# Patient Record
Sex: FEMALE | HISPANIC OR LATINO | Employment: FULL TIME | URBAN - METROPOLITAN AREA
[De-identification: names, ages, dates, MRNs, and addresses within clinical notes are randomized per-mention and may not be internally consistent; named-entity substitution may affect disease eponyms.]

---

## 2018-01-13 NOTE — MISCELLANEOUS
Message   Recorded as Task   Date: 01/12/2016 08:23 AM, Created By: Giovanny Noble   Task Name: Medical Complaint Callback   Assigned To: Rachelle Mtz Schedule   Regarding Patient: Ladarius Samuels, Status: Active   Comment:    JeyFrida - 12 Jan 2016 8:23 AM     TASK CREATED  Caller: Self; Medical Complaint; [phone] (Home); [phone] (Home)  patient delivered 1/1/16 and is complaining of pain when walking  Vaginal delivery  She noticed a lump on the outside of her vagina, and it is very painful  Please advise - [phone]   Tiara Lubin - 12 Jan 2016 9:25 AM     TASK REPLIED TO: Previously Assigned To JORGE OB,Nurse Schedule  p/c to pt appt today our ofc  1020 w/ K  Schea  Active Problems    1  Abnormal TSH (790 6) (R79 89)   2  Bronchitis, asthmatic (493 90) (J45 909)   3  Encounter for fetal anatomic survey (V28 81) (Z36)   4  Encounter for screening for risk of pre-term labor (V28 82) (Z36)   5  Family historic risk of congenital abnormality (655 23) (O35 8XX0)   6  Group beta Strep positive (041 02) (B95 1)   7  Hyperthyroidism in pregnancy, antepartum (648 13,242 90) (O99 280,E05 90)   8  Hyperthyroidism, maternal, antepartum, third trimester (648 13,242 90)   (O99 283,E05 90)   9  Need for Tdap vaccination (V06 1) (Z23)   10  Pregnancy, subsequent (V22 1) (Z34 80)   11  Third trimester pregnancy (V22 2) (Z33 1)   12  Upper respiratory infection (465 9) (J06 9)   13  Vaginal candidiasis (112 1) (B37 3)    Current Meds   1  Complete Prenatal/DHA MISC; Therapy: (Recorded:02Jun2015) to Recorded   2  Magnesium 250 MG Oral Tablet; Therapy: (Recorded:29Oct2015) to Recorded   3  Methimazole 10 MG Oral Tablet; Therapy: (Recorded:21Nxv7696) to Recorded    Allergies    1  No Known Drug Allergies    2  Animal dander   3  Animal dander - Cats   4   Seasonal    Signatures   Electronically signed by : Ross August, ; Jan 12 2016  9:25AM EST                       (Author)

## 2018-01-15 NOTE — PROGRESS NOTES
Assessment    1  Vulvar pain (625 9) (R10 2)    Plan  Vulvar pain    · Emergency Medicine Follow Up Evaluation and Treatment  Follow-up  Status: Complete   Done: [id number]   Ordered; For: Vulvar pain; Ordered By: Sonja Guido Performed:  Due: 10PMH2096; Last Updated By: Herbert Saeed; 2016 1:48:22 PM    Discussion/Summary  Discussion Summary:   Pt presents to the office for the past week she has been having pain that is increasing in her vulva  She went to the store yesterday and was unable to walk due to the pain  She noticed yesterday that her vulva was swelling  on exam she note above pt has two hard areas on her Rt perineum that are deep and painful to contact  There is no streaking or heat, their is no d/c noted when squeezed  She was sent to the ER for evaluation for possible draining of area  May be hematomas  RTO for PP visit  Counseling Documentation With Imm: The patient was counseled regarding instructions for management, patient and family education, risks and benefits of treatment options, importance of compliance with treatment  total time of encounter was 20 minutes and 15 minutes was spent counseling  Chief Complaint    1  Vulvar Pain  Chief Complaint Free Text Note Form: Pt here c/o two painful vaginal lumps , pt had  on 2016  History of Present Illness  Vulvar Pain:   Lizzy Shown presents with complaints of gradual onset of constant episodes of moderate r>l and right vulvar pain, described as sharp and throbbing, non-radiating  On a scale of 1 to 10, the patient rates the pain as 5  Episodes started about 1 week ago  She is currently experiencing vulvar pain  Her symptoms are probably caused by childbirth  Symptoms are made worse by direct contact  Symptoms are worsening  Review of Systems  Focused-Female:   Constitutional: feeling poorly  Genitourinary: as noted in HPI  Active Problems    1  Abnormal TSH (790 6) (R79 89)   2   Bronchitis, asthmatic (493 90) (J45 909)   3  Encounter for fetal anatomic survey (V28 81) (Z36)   4  Family historic risk of congenital abnormality (655 23) (O35 8XX0)   5  Need for Tdap vaccination (V06 1) (Z23)   6  Upper respiratory infection (465 9) (J06 9)   7  Vaginal candidiasis (112 1) (B37 3)    Past Medical History    1  History of Encounter for screening for risk of pre-term labor (V28 82) (Z36)   2  History of Group beta Strep positive (041 02) (B95 1)   3  History of elective  (V13 29) (Z87 42)   4  History of exposure to tuberculosis (V01 1) (Z20 1)   5  History of hepatitis C virus infection (V12 09) (Z86 19)   6  History of influenza (V12 09) (Z87 09)   7  History of streptococcal pharyngitis (V12 09) (Z87 09)   8  History of urinary tract infection (V13 02) (Z87 440)   9  History of Hyperthyroidism in pregnancy, antepartum (648 13,242 90) (O99 280,E05 90)   10  History of Hyperthyroidism, maternal, antepartum, third trimester (648 13,242 90)    (O99 283,E05 90)   11  History of Joint pain (719 40) (M25 50)   12  No pertinent past medical history   13  History of No pertinent past surgical history   14  History of Pregnancy, subsequent (V22 1) (Z34 80)   15  History of Third trimester pregnancy (V22 2) (Z33 1)    Surgical History    1  History of Surgically Induced  - By Dilation And Curettage   2  History of Surgically Induced  - By Dilation And Evacuation    Family History    1  Family history of hypertension (V17 49) (Z82 49)   2  Family history of migraine headaches (V17 2) (Z82 0)   3  Family history of type 2 diabetes mellitus (V18 0) (Z83 3)   4  Family history of Joint pain   5  Family history of Osteopetrosis    6  Family history of hypertension (V17 49) (Z82 49)    7  Family history of malignant neoplasm of stomach (V16 0) (Z80 0)   8  Family history of Osteopetrosis    9  Family history of hypertension (V17 49) (Z82 49)   10   Family history of type 2 diabetes mellitus (V18 0) (Z83 3) 11  Family history of arthritis (V17 7) (Z82 61)   12  Family history of type 2 diabetes mellitus (V18 0) (Z83 3)    13  Family history of hypertension (V17 49) (Z82 49)   14  Family history of myocardial infarction (V17 3) (Z82 49)    15  Family history of varicose veins (V17 49) (Z82 49)    16  Family history of epilepsy (V17 2) (Z82 0)    Social History    · Always uses seat belt   · Former smoker (V15 82) (K68 089)   · Single   · Smokes cigarettes (305 1) (F17 210)    Current Meds   1  Complete Prenatal/DHA MISC; Therapy: (Recorded:02Jun2015) to Recorded   2  Magnesium 250 MG Oral Tablet; Therapy: (Recorded:29Oct2015) to Recorded   3  Methimazole 10 MG Oral Tablet; Therapy: (Recorded:00Qwt9946) to Recorded  Medication List Reviewed: The medication list was reviewed and updated today  Allergies    1  No Known Drug Allergies    2  Animal dander   3  Animal dander - Cats   4  Seasonal    Vitals  Vital Signs [Data Includes: Current Encounter]    Recorded: 68SQT0671 13:85TM   Systolic 985, LUE, Sitting   Diastolic 70, LUE, Sitting   Weight 162 lb    BMI Calculated 26 15   BSA Calculated 1 83   LMP breastfeeding     Physical Exam         pt has 2 hard areas deep in tissue that are causing pain  Stitch in place  Constitutional   General appearance: No acute distress, well appearing and well nourished  Genitourinary   External genitalia: Abnormal   There was swelling of the right labia majora  Vagina: Normal, no lesions or dryness appreciated  Vagina: moderate bleeding  Psychiatric   Orientation to person, place, and time: Normal     Mood and affect: Normal        Future Appointments    Date/Time Provider Specialty Site   02/11/2016 10:00 AM SHAKIRA Page   Obstetrics/Gynecology [facility]     Signatures   Electronically signed by : Vince Valladares[address]; Jan 12 2016  1:05PM EST                       (Author)    Electronically signed by : SHAKIRA Cortes ; Jan 12 2016  2:57PM EST                       (Author)

## 2023-03-22 ENCOUNTER — OFFICE VISIT (OUTPATIENT)
Dept: FAMILY MEDICINE CLINIC | Facility: CLINIC | Age: 33
End: 2023-03-22

## 2023-03-22 VITALS
HEART RATE: 82 BPM | HEIGHT: 66 IN | OXYGEN SATURATION: 98 % | DIASTOLIC BLOOD PRESSURE: 68 MMHG | WEIGHT: 167.5 LBS | BODY MASS INDEX: 26.92 KG/M2 | SYSTOLIC BLOOD PRESSURE: 110 MMHG | TEMPERATURE: 97.8 F

## 2023-03-22 DIAGNOSIS — R10.13 DYSPEPSIA: ICD-10-CM

## 2023-03-22 DIAGNOSIS — R63.5 ABNORMAL WEIGHT GAIN: ICD-10-CM

## 2023-03-22 DIAGNOSIS — Z11.4 SCREENING FOR HIV (HUMAN IMMUNODEFICIENCY VIRUS): ICD-10-CM

## 2023-03-22 DIAGNOSIS — F32.2 SEVERE DEPRESSION (HCC): ICD-10-CM

## 2023-03-22 DIAGNOSIS — H69.83 DYSFUNCTION OF BOTH EUSTACHIAN TUBES: ICD-10-CM

## 2023-03-22 DIAGNOSIS — R79.89 ABNORMAL TSH: ICD-10-CM

## 2023-03-22 DIAGNOSIS — K64.4 EXTERNAL HEMORRHOIDS: ICD-10-CM

## 2023-03-22 DIAGNOSIS — R53.83 OTHER FATIGUE: ICD-10-CM

## 2023-03-22 DIAGNOSIS — Z00.00 ANNUAL PHYSICAL EXAM: Primary | ICD-10-CM

## 2023-03-22 DIAGNOSIS — E55.9 VITAMIN D DEFICIENCY: ICD-10-CM

## 2023-03-22 DIAGNOSIS — Z11.59 NEED FOR HEPATITIS C SCREENING TEST: ICD-10-CM

## 2023-03-22 RX ORDER — OMEPRAZOLE 20 MG/1
20 CAPSULE, DELAYED RELEASE ORAL DAILY
Qty: 30 CAPSULE | Refills: 0 | Status: SHIPPED | OUTPATIENT
Start: 2023-03-22

## 2023-03-22 RX ORDER — FLUOXETINE 10 MG/1
10 CAPSULE ORAL DAILY
Qty: 30 CAPSULE | Refills: 0 | Status: SHIPPED | OUTPATIENT
Start: 2023-03-22

## 2023-03-22 RX ORDER — FLUTICASONE PROPIONATE 50 MCG
1 SPRAY, SUSPENSION (ML) NASAL DAILY
Qty: 9.9 ML | Refills: 0 | Status: SHIPPED | OUTPATIENT
Start: 2023-03-22

## 2023-03-22 RX ORDER — HYDROCORTISONE 25 MG/G
CREAM TOPICAL 2 TIMES DAILY
Qty: 28 G | Refills: 0 | Status: SHIPPED | OUTPATIENT
Start: 2023-03-22

## 2023-03-22 RX ORDER — UREA 10 %
2 LOTION (ML) TOPICAL
COMMUNITY
End: 2023-03-22

## 2023-03-22 NOTE — PROGRESS NOTES
[facility] PRACT[facility]    NAME: Libby Ahmadi  AGE: 35 y o  SEX: female  : 1990     DATE: 3/27/2023     Assessment and Plan:     Problem List Items Addressed This Visit        Other    Abnormal TSH     Patient's been off medication for 7 years  Repeat TSH with T4         Relevant Orders    TSH, 3rd generation with Free T4 reflex (Completed)   Other Visit Diagnoses     Annual physical exam    -  Primary    Relevant Orders    Ambulatory Referral to Obstetrics / Gynecology    Severe depression ([facility] [address] )        Relevant Medications    FLUoxetine (PROzac) 10 mg capsule    Abnormal weight gain        Relevant Orders    Comprehensive metabolic panel (Completed)    Lipid panel (Completed)    Other fatigue        Relevant Orders    CBC and differential (Completed)    Comprehensive metabolic panel (Completed)    Vitamin D deficiency        Relevant Orders    Vitamin D 25 hydroxy (Completed)    Dysfunction of both eustachian tubes        Relevant Medications    fluticasone (FLONASE) 50 mcg/act nasal spray    Need for hepatitis C screening test        Relevant Orders    Hepatitis C Antibody ([facility]) (Completed)    Screening for HIV (human immunodeficiency virus)        Relevant Orders    HIV 1/2 AG/AB w Reflex [facility] for 2 yr old and above (Completed)    Dyspepsia        Relevant Medications    omeprazole (PriLOSEC) 20 mg delayed release capsule    External hemorrhoids        Relevant Medications    hydrocortisone (ANUSOL-HC) 2 5 % rectal cream        Sleep disturbance  Falling and staying  Patient does have good sleep hygiene  Taking Zquil for sleep     Spray CBD for anxiety     Immunizations and preventive care screenings were discussed with patient today  Appropriate education was printed on patient's after visit summary      Counseling:  Alcohol/drug use: discussed moderation in alcohol intake, the recommendations for healthy alcohol use, and avoidance of illicit drug use  Dental Health: discussed importance of regular tooth brushing, flossing, and dental visits  Injury prevention: discussed safety/seat belts, safety helmets, smoke detectors, carbon dioxide detectors, and smoking near bedding or upholstery  Sexual health: discussed sexually transmitted diseases, partner selection, use of condoms, avoidance of unintended pregnancy, and contraceptive alternatives  Exercise: the importance of regular exercise/physical activity was discussed  Recommend exercise 3-5 times per week for at least 30 minutes  BMI Counseling: Body mass index is 27 37 kg/m²  The BMI is above normal  Nutrition recommendations include decreasing portion sizes, consuming healthier snacks, reducing intake of saturated and trans fat and reducing intake of cholesterol  Exercise recommendations include moderate physical activity 150 minutes/week  No pharmacotherapy was ordered  Patient referred to PCP  Rationale for BMI follow-up plan is due to patient being overweight or obese  Depression Screening and Follow-up Plan: Patient's depression screening was positive with a PHQ-2 score of 5  Their PHQ-9 score was 22  Patient assessed for underlying major depression  Brief counseling provided and recommend additional follow-up/re-evaluation next office visit  Patient advised to follow-up with PCP for further management  Start medication and provide resources for therapy         Return in about 4 weeks (around 4/19/2023) for Recheck depression   Chief Complaint:     Chief Complaint   Patient presents with   • Establish Care      History of Present Illness:     Adult Annual Physical   Patient here for a comprehensive physical exam  The patient reports problems - Thyroid, Depression, Anxiety  Diet and Physical Activity  Diet/Nutrition: poor diet and consuming 3-5 servings of fruits/vegetables daily  No red meat  Over/under eating  Exercise: no formal exercise        Depression Screening  PHQ-2/9 Depression Screening    Little interest or pleasure in doing things: 3 - nearly every day  Feeling down, depressed, or hopeless: 3 - nearly every day  Trouble falling or staying asleep, or sleeping too much: 3 - nearly every day  Feeling tired or having little energy: 3 - nearly every day  Poor appetite or overeating: 3 - nearly every day  Feeling bad about yourself - or that you are a failure or have let yourself or your family down: 3 - nearly every day  Trouble concentrating on things, such as reading the newspaper or watching television: 3 - nearly every day  Moving or speaking so slowly that other people could have noticed  Or the opposite - being so fidgety or restless that you have been moving around a lot more than usual: 3 - nearly every day  Thoughts that you would be better off dead, or of hurting yourself in some way: 2 - more than half the days  PHQ-2 Score: 6  PHQ-2 Interpretation: POSITIVE depression screen  PHQ-9 Score: 26   PHQ-9 Interpretation: Severe depression        General Health  Sleep: sleeps poorly, gets 4-6 hours of sleep on average and trouble falling/staying asleep   Hearing: decreased - bilateral  Began after plane ride  Vision: goes for regular eye exams and wears glasses  Dental: no dental visits for >1 year  Will be scheduling an appointment     /GYN Health  Last menstrual period: 3/15/23  5 days   Contraceptive method: none  History of STDs?: no      Review of Systems:     Review of Systems   Constitutional: Positive for activity change, appetite change, fatigue and unexpected weight change  Negative for fever  HENT: Negative for sore throat  Eyes: Negative for visual disturbance  Respiratory: Negative for cough, choking, chest tightness and shortness of breath  Cardiovascular: Negative for chest pain, palpitations and leg swelling  Gastrointestinal: Negative for abdominal pain, constipation, diarrhea and nausea     Endocrine: Negative for cold intolerance and heat intolerance  Genitourinary: Negative for flank pain  Musculoskeletal: Negative for back pain and neck pain  Skin: Negative for rash  Neurological: Negative for headaches  Psychiatric/Behavioral: Positive for decreased concentration and dysphoric mood  Negative for behavioral problems and confusion  The patient is nervous/anxious  Past Medical History:     Past Medical History:   Diagnosis Date   • Disease of thyroid gland       Past Surgical History:     History reviewed  No pertinent surgical history  Social History:     Social History     Socioeconomic History   • Marital status:      Spouse name: None   • Number of children: None   • Years of education: None   • Highest education level: None   Occupational History   • None   Tobacco Use   • Smoking status: Never     Passive exposure: Past (occassionally, not daily)   • Smokeless tobacco: None   Substance and Sexual Activity   • Alcohol use: No   • Drug use:  Yes   • Sexual activity: Yes     Partners: Female   Other Topics Concern   • None   Social History Narrative   • None     Social Determinants of Health     Financial Resource Strain: Not on file   Food Insecurity: Not on file   Transportation Needs: Not on file   Physical Activity: Not on file   Stress: Not on file   Social Connections: Not on file   Intimate Partner Violence: Not on file   Housing Stability: Not on file      Family History:     Family History   Problem Relation Age of Onset   • Hyperlipidemia Mother    • Hypertension Mother    • Hyperlipidemia Father    • Hypertension Father    • Heart disease Maternal Grandmother    • Diabetes Maternal Grandmother    • Stomach cancer Maternal Grandmother    • Heart disease Maternal Grandfather    • Diabetes Maternal Grandfather    • Heart disease Paternal Grandmother    • Diabetes Paternal Grandmother    • Heart disease Paternal Grandfather    • Diabetes Paternal Grandfather       Current Medications: "    Current Outpatient Medications   Medication Sig Dispense Refill   • FLUoxetine (PROzac) 10 mg capsule Take 1 capsule (10 mg total) by mouth daily 30 capsule 0   • fluticasone (FLONASE) 50 mcg/act nasal spray 1 spray into each nostril daily 9 9 mL 0   • hydrocortisone (ANUSOL-HC) 2 5 % rectal cream Apply topically 2 (two) times a day 28 g 0   • omeprazole (PriLOSEC) 20 mg delayed release capsule Take 1 capsule (20 mg total) by mouth daily 30 capsule 0   • Cholecalciferol 2000 UNITS CAPS Take by mouth  (Patient not taking: Reported on 3/22/2023)     • Magnesium 500 MG CAPS Take 500 mg by mouth  (Patient not taking: Reported on 3/22/2023)     • METHIMAZOLE PO Take 5 mg by mouth  (Patient not taking: Reported on 3/22/2023)       No current facility-administered medications for this visit  Allergies: Allergies   Allergen Reactions   • Pollen Extract Other (See Comments)   • Other Rash      Physical Exam:     /68 (BP Location: Left arm, Patient Position: Sitting, Cuff Size: Large)   Pulse 82   Temp 97 8 °F (36 6 °C)   Ht 5' 5 6\" (1 666 m)   Wt 76 kg (167 lb 8 oz)   SpO2 98%   BMI 27 37 kg/m²     Physical Exam  Vitals and nursing note reviewed  Constitutional:       General: She is in acute distress  Appearance: Normal appearance  She is well-developed  HENT:      Head: Normocephalic and atraumatic  Nose: Congestion present  Eyes:      Extraocular Movements: Extraocular movements intact  Conjunctiva/sclera: Conjunctivae normal       Pupils: Pupils are equal, round, and reactive to light  Cardiovascular:      Rate and Rhythm: Normal rate and regular rhythm  Heart sounds: Normal heart sounds  Pulmonary:      Effort: Pulmonary effort is normal       Breath sounds: Normal breath sounds  Abdominal:      General: Bowel sounds are normal       Palpations: Abdomen is soft  Musculoskeletal:         General: Normal range of motion  Cervical back: Normal range of motion   " Skin:     General: Skin is warm and dry  Neurological:      General: No focal deficit present  Mental Status: She is alert and oriented to person, place, and time  Psychiatric:         Attention and Perception: She is inattentive  Mood and Affect: Mood is anxious and depressed  Affect is tearful  Speech: Speech normal          Thought Content: Thought content includes suicidal ideation  Thought content does not include homicidal or suicidal plan           Cognition and Memory: Cognition normal           Andrew Srivastava MD   [id number] [address]

## 2023-03-23 ENCOUNTER — APPOINTMENT (OUTPATIENT)
Dept: LAB | Facility: HOSPITAL | Age: 33
End: 2023-03-23
Attending: FAMILY MEDICINE

## 2023-03-23 DIAGNOSIS — R63.5 ABNORMAL WEIGHT GAIN: ICD-10-CM

## 2023-03-23 DIAGNOSIS — R79.89 ABNORMAL TSH: ICD-10-CM

## 2023-03-23 DIAGNOSIS — E55.9 VITAMIN D DEFICIENCY: ICD-10-CM

## 2023-03-23 DIAGNOSIS — R53.83 OTHER FATIGUE: ICD-10-CM

## 2023-03-23 DIAGNOSIS — Z11.4 SCREENING FOR HIV (HUMAN IMMUNODEFICIENCY VIRUS): ICD-10-CM

## 2023-03-23 DIAGNOSIS — Z11.59 NEED FOR HEPATITIS C SCREENING TEST: ICD-10-CM

## 2023-03-23 LAB
25(OH)D3 SERPL-MCNC: 22.7 NG/ML (ref 30–100)
ALBUMIN SERPL BCP-MCNC: 4.2 G/DL (ref 3.5–5)
ALP SERPL-CCNC: 56 U/L (ref 34–104)
ALT SERPL W P-5'-P-CCNC: 7 U/L (ref 7–52)
ANION GAP SERPL CALCULATED.3IONS-SCNC: 8 MMOL/L (ref 4–13)
AST SERPL W P-5'-P-CCNC: 9 U/L (ref 13–39)
BASOPHILS # BLD AUTO: 0.08 THOUSANDS/ÂΜL (ref 0–0.1)
BASOPHILS NFR BLD AUTO: 1 % (ref 0–1)
BILIRUB SERPL-MCNC: 0.33 MG/DL (ref 0.2–1)
BUN SERPL-MCNC: 9 MG/DL (ref 5–25)
CALCIUM SERPL-MCNC: 9.3 MG/DL (ref 8.4–10.2)
CHLORIDE SERPL-SCNC: 102 MMOL/L (ref 96–108)
CHOLEST SERPL-MCNC: 174 MG/DL
CO2 SERPL-SCNC: 26 MMOL/L (ref 21–32)
CREAT SERPL-MCNC: 0.56 MG/DL (ref 0.6–1.3)
EOSINOPHIL # BLD AUTO: 0.61 THOUSAND/ÂΜL (ref 0–0.61)
EOSINOPHIL NFR BLD AUTO: 8 % (ref 0–6)
ERYTHROCYTE [DISTWIDTH] IN BLOOD BY AUTOMATED COUNT: 13.5 % (ref 11.6–15.1)
GFR SERPL CREATININE-BSD FRML MDRD: 122 ML/MIN/1.73SQ M
GLUCOSE P FAST SERPL-MCNC: 92 MG/DL (ref 65–99)
HCT VFR BLD AUTO: 41.5 % (ref 34.8–46.1)
HCV AB SER QL: NORMAL
HDLC SERPL-MCNC: 50 MG/DL
HGB BLD-MCNC: 13.6 G/DL (ref 11.5–15.4)
HIV 1+2 AB+HIV1 P24 AG SERPL QL IA: NORMAL
HIV 2 AB SERPL QL IA: NORMAL
HIV1 AB SERPL QL IA: NORMAL
HIV1 P24 AG SERPL QL IA: NORMAL
IMM GRANULOCYTES # BLD AUTO: 0.01 THOUSAND/UL (ref 0–0.2)
IMM GRANULOCYTES NFR BLD AUTO: 0 % (ref 0–2)
LDLC SERPL CALC-MCNC: 101 MG/DL (ref 0–100)
LYMPHOCYTES # BLD AUTO: 2.27 THOUSANDS/ÂΜL (ref 0.6–4.47)
LYMPHOCYTES NFR BLD AUTO: 30 % (ref 14–44)
MCH RBC QN AUTO: 28.4 PG (ref 26.8–34.3)
MCHC RBC AUTO-ENTMCNC: 32.8 G/DL (ref 31.4–37.4)
MCV RBC AUTO: 87 FL (ref 82–98)
MONOCYTES # BLD AUTO: 0.32 THOUSAND/ÂΜL (ref 0.17–1.22)
MONOCYTES NFR BLD AUTO: 4 % (ref 4–12)
NEUTROPHILS # BLD AUTO: 4.22 THOUSANDS/ÂΜL (ref 1.85–7.62)
NEUTS SEG NFR BLD AUTO: 57 % (ref 43–75)
NONHDLC SERPL-MCNC: 124 MG/DL
NRBC BLD AUTO-RTO: 0 /100 WBCS
PLATELET # BLD AUTO: 460 THOUSANDS/UL (ref 149–390)
PMV BLD AUTO: 9.4 FL (ref 8.9–12.7)
POTASSIUM SERPL-SCNC: 3.8 MMOL/L (ref 3.5–5.3)
PROT SERPL-MCNC: 7.8 G/DL (ref 6.4–8.4)
RBC # BLD AUTO: 4.79 MILLION/UL (ref 3.81–5.12)
SODIUM SERPL-SCNC: 136 MMOL/L (ref 135–147)
TRIGL SERPL-MCNC: 115 MG/DL
TSH SERPL DL<=0.05 MIU/L-ACNC: 3.69 UIU/ML (ref 0.45–4.5)
WBC # BLD AUTO: 7.51 THOUSAND/UL (ref 4.31–10.16)

## 2023-04-03 ENCOUNTER — ANNUAL EXAM (OUTPATIENT)
Dept: OBGYN CLINIC | Facility: CLINIC | Age: 33
End: 2023-04-03

## 2023-04-03 VITALS
HEIGHT: 66 IN | SYSTOLIC BLOOD PRESSURE: 112 MMHG | DIASTOLIC BLOOD PRESSURE: 60 MMHG | WEIGHT: 174 LBS | BODY MASS INDEX: 27.97 KG/M2

## 2023-04-03 DIAGNOSIS — K64.4 EXTERNAL HEMORRHOID: ICD-10-CM

## 2023-04-03 DIAGNOSIS — Z11.3 SCREEN FOR STD (SEXUALLY TRANSMITTED DISEASE): ICD-10-CM

## 2023-04-03 DIAGNOSIS — Z01.411 ENCOUNTER FOR GYNECOLOGICAL EXAMINATION WITH ABNORMAL FINDING: Primary | ICD-10-CM

## 2023-04-03 DIAGNOSIS — Z00.00 ANNUAL PHYSICAL EXAM: ICD-10-CM

## 2023-04-03 NOTE — PROGRESS NOTES
Assessment/Plan:    No problem-specific Assessment & Plan notes found for this encounter  Diagnoses and all orders for this visit:    Encounter for gynecological examination with abnormal finding  -     Liquid-based pap, screening    Screen for STD (sexually transmitted disease)  -     Chlamydia/GC amplified DNA by PCR    External hemorrhoid  -     Ambulatory Referral to Colorectal Surgery; Future    Annual physical exam  -     Ambulatory Referral to Obstetrics / Gynecology        Pap and GC/chlamydia screening done  We will call with STD testing results  Referral to colorectal for hemorrhoid entered; patient to call for appointment  Call if periods worsen or change  If no problems, patient to return in 1 year for routine gyn care  Subjective:      Patient ID: Alysha Sandoval is a 35 y o  female  Patient is a  here for yearly gyn exam   She is new to our office today  Has not been seen by a gyn since 2016  States she is doing well overall  Periods are regular once a month, and bleeding lasts for 4 days  Experiences bloating, mood swings, and fluid retention  Denies heavy bleeding and severe cramping  Patient is sexually active with a female partner; requests STD screening  Complains of a recurrent external hemorrhoid; currently using OTC cream   Denies bowel/bladder changes, pelvic pain, abdominal pain, n/v, and change in appetite  Followed by PCP for thyroid issue  Patient is performing self-breast exam   Has tenderness with her periods  Denies new masses, skin changes, and nipple discharge  Family cancer history negative  The following portions of the patient's history were reviewed and updated as appropriate: allergies, current medications, past family history, past medical history, past social history, past surgical history and problem list     Review of Systems   Constitutional: Negative for appetite change and unexpected weight change     Cardiovascular:        No masses, "skin changes, nipple discharge, and pain/tenderness  Gastrointestinal: Negative for abdominal distention, abdominal pain, constipation, diarrhea, nausea and vomiting  Genitourinary: Negative for difficulty urinating, dysuria, frequency, genital sores, hematuria, menstrual problem, pelvic pain, urgency, vaginal bleeding, vaginal discharge and vaginal pain  Objective:      /60 (BP Location: Left arm, Patient Position: Sitting, Cuff Size: Adult)   Ht 5' 5 6\" (1 666 m)   Wt 78 9 kg (174 lb)   LMP 03/15/2023 (Exact Date)   BMI 28 43 kg/m²          Physical Exam  Vitals reviewed  Exam conducted with a chaperone present  Constitutional:       Appearance: Normal appearance  She is well-developed  Neck:      Thyroid: No thyromegaly  Pulmonary:      Effort: Pulmonary effort is normal    Chest:   Breasts:     Breasts are symmetrical       Right: Normal  No swelling, bleeding, inverted nipple, mass, nipple discharge, skin change or tenderness  Left: Normal  No swelling, bleeding, inverted nipple, mass, nipple discharge, skin change or tenderness  Abdominal:      General: Abdomen is flat  There is no distension  Palpations: Abdomen is soft  Tenderness: There is no abdominal tenderness  Genitourinary:     General: Normal vulva  Pubic Area: No rash  Labia:         Right: No rash, tenderness, lesion or injury  Left: No rash, tenderness, lesion or injury  Vagina: Normal  No vaginal discharge, erythema, tenderness or bleeding  Cervix: Normal       Uterus: Normal        Adnexa: Right adnexa normal and left adnexa normal         Right: No mass, tenderness or fullness  Left: No mass, tenderness or fullness  Rectum: External hemorrhoid present  Musculoskeletal:      Cervical back: Neck supple  Lymphadenopathy:      Cervical: No cervical adenopathy  Upper Body:      Right upper body: No supraclavicular or axillary adenopathy        Left " upper body: No supraclavicular or axillary adenopathy  Lower Body: No right inguinal adenopathy  No left inguinal adenopathy  Skin:     General: Skin is warm and dry  Neurological:      Mental Status: She is alert and oriented to person, place, and time  Psychiatric:         Mood and Affect: Mood normal          Behavior: Behavior normal  Behavior is cooperative  Thought Content:  Thought content normal          Judgment: Judgment normal

## 2023-04-05 LAB
C TRACH DNA SPEC QL NAA+PROBE: NEGATIVE
N GONORRHOEA DNA SPEC QL NAA+PROBE: NEGATIVE

## 2023-04-19 PROBLEM — R10.13 DYSPEPSIA: Status: ACTIVE | Noted: 2023-04-19

## 2023-04-19 PROBLEM — G47.9 SLEEP DISTURBANCE: Status: ACTIVE | Noted: 2023-04-19

## 2023-04-19 PROBLEM — F32.2 SEVERE DEPRESSION (HCC): Status: ACTIVE | Noted: 2023-04-19

## 2023-10-18 ENCOUNTER — OFFICE VISIT (OUTPATIENT)
Dept: FAMILY MEDICINE CLINIC | Facility: CLINIC | Age: 33
End: 2023-10-18
Payer: COMMERCIAL

## 2023-10-18 ENCOUNTER — APPOINTMENT (OUTPATIENT)
Dept: LAB | Facility: HOSPITAL | Age: 33
End: 2023-10-18
Attending: FAMILY MEDICINE
Payer: COMMERCIAL

## 2023-10-18 VITALS
DIASTOLIC BLOOD PRESSURE: 70 MMHG | OXYGEN SATURATION: 98 % | RESPIRATION RATE: 16 BRPM | HEIGHT: 66 IN | BODY MASS INDEX: 27 KG/M2 | SYSTOLIC BLOOD PRESSURE: 110 MMHG | TEMPERATURE: 97.9 F | WEIGHT: 168 LBS | HEART RATE: 73 BPM

## 2023-10-18 DIAGNOSIS — M62.89 MUSCLE STIFFNESS: ICD-10-CM

## 2023-10-18 DIAGNOSIS — M79.89 SWELLING OF BOTH HANDS: ICD-10-CM

## 2023-10-18 DIAGNOSIS — F32.2 SEVERE DEPRESSION (HCC): ICD-10-CM

## 2023-10-18 DIAGNOSIS — R89.8 EOSINOPHIL COUNT RAISED: ICD-10-CM

## 2023-10-18 DIAGNOSIS — M25.542 ARTHRALGIA OF BOTH HANDS: ICD-10-CM

## 2023-10-18 DIAGNOSIS — G47.9 SLEEP DISTURBANCE: ICD-10-CM

## 2023-10-18 DIAGNOSIS — R10.13 DYSPEPSIA: ICD-10-CM

## 2023-10-18 DIAGNOSIS — M25.541 ARTHRALGIA OF BOTH HANDS: ICD-10-CM

## 2023-10-18 DIAGNOSIS — R89.9 ABNORMAL LABORATORY TEST: ICD-10-CM

## 2023-10-18 DIAGNOSIS — Z00.00 ANNUAL PHYSICAL EXAM: Primary | ICD-10-CM

## 2023-10-18 DIAGNOSIS — R79.89 ABNORMAL TSH: ICD-10-CM

## 2023-10-18 DIAGNOSIS — Z83.2 FAMILY HISTORY OF AUTOIMMUNE DISORDER: ICD-10-CM

## 2023-10-18 LAB
ALBUMIN SERPL BCP-MCNC: 4.1 G/DL (ref 3.5–5)
ALP SERPL-CCNC: 58 U/L (ref 34–104)
ALT SERPL W P-5'-P-CCNC: 11 U/L (ref 7–52)
ANA SER QL IA: NEGATIVE
ANION GAP SERPL CALCULATED.3IONS-SCNC: 8 MMOL/L
AST SERPL W P-5'-P-CCNC: 13 U/L (ref 13–39)
BASOPHILS # BLD AUTO: 0.06 THOUSANDS/ÂΜL (ref 0–0.1)
BASOPHILS NFR BLD AUTO: 1 % (ref 0–1)
BILIRUB SERPL-MCNC: 0.25 MG/DL (ref 0.2–1)
BUN SERPL-MCNC: 13 MG/DL (ref 5–25)
CALCIUM SERPL-MCNC: 9 MG/DL (ref 8.4–10.2)
CHLORIDE SERPL-SCNC: 104 MMOL/L (ref 96–108)
CO2 SERPL-SCNC: 24 MMOL/L (ref 21–32)
CREAT SERPL-MCNC: 0.62 MG/DL (ref 0.6–1.3)
CREAT UR-MCNC: 125.3 MG/DL
CRP SERPL QL: 8.5 MG/L
EOSINOPHIL # BLD AUTO: 0.5 THOUSAND/ÂΜL (ref 0–0.61)
EOSINOPHIL NFR BLD AUTO: 7 % (ref 0–6)
ERYTHROCYTE [DISTWIDTH] IN BLOOD BY AUTOMATED COUNT: 13.7 % (ref 11.6–15.1)
ERYTHROCYTE [SEDIMENTATION RATE] IN BLOOD: 19 MM/HOUR (ref 0–19)
FERRITIN SERPL-MCNC: 23 NG/ML (ref 11–307)
GFR SERPL CREATININE-BSD FRML MDRD: 118 ML/MIN/1.73SQ M
GLUCOSE P FAST SERPL-MCNC: 88 MG/DL (ref 65–99)
HCT VFR BLD AUTO: 38 % (ref 34.8–46.1)
HGB BLD-MCNC: 12.1 G/DL (ref 11.5–15.4)
IMM GRANULOCYTES # BLD AUTO: 0.03 THOUSAND/UL (ref 0–0.2)
IMM GRANULOCYTES NFR BLD AUTO: 0 % (ref 0–2)
IRON SATN MFR SERPL: 11 % (ref 15–50)
IRON SERPL-MCNC: 35 UG/DL (ref 50–212)
LYMPHOCYTES # BLD AUTO: 2.18 THOUSANDS/ÂΜL (ref 0.6–4.47)
LYMPHOCYTES NFR BLD AUTO: 30 % (ref 14–44)
MCH RBC QN AUTO: 27.6 PG (ref 26.8–34.3)
MCHC RBC AUTO-ENTMCNC: 31.8 G/DL (ref 31.4–37.4)
MCV RBC AUTO: 87 FL (ref 82–98)
MICROALBUMIN UR-MCNC: <7 MG/L
MICROALBUMIN/CREAT 24H UR: <6 MG/G CREATININE (ref 0–30)
MONOCYTES # BLD AUTO: 0.32 THOUSAND/ÂΜL (ref 0.17–1.22)
MONOCYTES NFR BLD AUTO: 4 % (ref 4–12)
NEUTROPHILS # BLD AUTO: 4.12 THOUSANDS/ÂΜL (ref 1.85–7.62)
NEUTS SEG NFR BLD AUTO: 58 % (ref 43–75)
NRBC BLD AUTO-RTO: 0 /100 WBCS
PLATELET # BLD AUTO: 450 THOUSANDS/UL (ref 149–390)
PMV BLD AUTO: 9.2 FL (ref 8.9–12.7)
POTASSIUM SERPL-SCNC: 3.8 MMOL/L (ref 3.5–5.3)
PROT SERPL-MCNC: 7.4 G/DL (ref 6.4–8.4)
RBC # BLD AUTO: 4.38 MILLION/UL (ref 3.81–5.12)
SODIUM SERPL-SCNC: 136 MMOL/L (ref 135–147)
TIBC SERPL-MCNC: 325 UG/DL (ref 250–450)
TSH SERPL DL<=0.05 MIU/L-ACNC: 2.13 UIU/ML (ref 0.45–4.5)
UIBC SERPL-MCNC: 290 UG/DL (ref 155–355)
WBC # BLD AUTO: 7.21 THOUSAND/UL (ref 4.31–10.16)

## 2023-10-18 PROCEDURE — 82570 ASSAY OF URINE CREATININE: CPT

## 2023-10-18 PROCEDURE — 86140 C-REACTIVE PROTEIN: CPT

## 2023-10-18 PROCEDURE — 36415 COLL VENOUS BLD VENIPUNCTURE: CPT

## 2023-10-18 PROCEDURE — 86200 CCP ANTIBODY: CPT

## 2023-10-18 PROCEDURE — 83550 IRON BINDING TEST: CPT

## 2023-10-18 PROCEDURE — 85025 COMPLETE CBC W/AUTO DIFF WBC: CPT

## 2023-10-18 PROCEDURE — 82043 UR ALBUMIN QUANTITATIVE: CPT

## 2023-10-18 PROCEDURE — 86038 ANTINUCLEAR ANTIBODIES: CPT

## 2023-10-18 PROCEDURE — 85652 RBC SED RATE AUTOMATED: CPT

## 2023-10-18 PROCEDURE — 84443 ASSAY THYROID STIM HORMONE: CPT

## 2023-10-18 PROCEDURE — 82728 ASSAY OF FERRITIN: CPT

## 2023-10-18 PROCEDURE — 86430 RHEUMATOID FACTOR TEST QUAL: CPT

## 2023-10-18 PROCEDURE — 99395 PREV VISIT EST AGE 18-39: CPT | Performed by: FAMILY MEDICINE

## 2023-10-18 PROCEDURE — 80053 COMPREHEN METABOLIC PANEL: CPT

## 2023-10-18 PROCEDURE — 83540 ASSAY OF IRON: CPT

## 2023-10-18 RX ORDER — FLUOXETINE HYDROCHLORIDE 20 MG/1
20 CAPSULE ORAL DAILY
Qty: 90 CAPSULE | Refills: 3 | Status: SHIPPED | OUTPATIENT
Start: 2023-10-18 | End: 2024-10-12

## 2023-10-18 NOTE — PROGRESS NOTES
[facility]    NAME: Jhoan Weeks  AGE: 35 y.o. SEX: female  : 1990     DATE: 10/20/2023     Assessment and Plan:     Problem List Items Addressed This Visit        Other    Abnormal TSH     Hyperthyroid while pregnant. Off medications for the past 7 years. Repeat TSH. Relevant Orders    TSH, 3rd generation with Free T4 reflex (Completed)    Dyspepsia     Stable. Takes medication as needed. Has tried to adjust her diet to account for the acid reflux. Severe depression (HCC)    Relevant Medications    FLUoxetine (PROzac) 20 mg capsule    Sleep disturbance   Other Visit Diagnoses     Annual physical exam    -  Primary    Eosinophil count raised        Relevant Orders    CBC and differential (Completed)    Arthralgia of both hands        Relevant Orders    MAKENNA Screen w/ Reflex to Titer/Pattern (Completed)    RF Screen w/ Reflex to Titer (Completed)    Cyclic citrul peptide antibody, IgG (Completed)    C-reactive protein (Completed)    Sedimentation rate, automated (Completed)    Swelling of both hands        Relevant Orders    MAKENNA Screen w/ Reflex to Titer/Pattern (Completed)    Comprehensive metabolic panel (Completed)    Albumin / creatinine urine ratio (Completed)    Sedimentation rate, automated (Completed)    Family history of autoimmune disorder        Muscle stiffness        Relevant Orders    Sedimentation rate, automated (Completed)    Abnormal laboratory test        Relevant Orders    Iron Panel (Includes Ferritin, Iron Sat%, Iron, and TIBC) (Completed)        Patient's grandmother had rheumatoid arthritis. Patient's half sister has lupus. Obtain labs. Consider US for synovitis. Obtain labwork listed above. Follow up in 6 weeks. Immunizations and preventive care screenings were discussed with patient today.  Appropriate education was printed on patient's after visit summary. Counseling:  Alcohol/drug use: discussed moderation in alcohol intake, the recommendations for healthy alcohol use, and avoidance of illicit drug use. Dental Health: discussed importance of regular tooth brushing, flossing, and dental visits. Injury prevention: discussed safety/seat belts, safety helmets, smoke detectors, carbon dioxide detectors, and smoking near bedding or upholstery. Sexual health: discussed sexually transmitted diseases, partner selection, use of condoms, avoidance of unintended pregnancy, and contraceptive alternatives. Exercise: the importance of regular exercise/physical activity was discussed. Recommend exercise 3-5 times per week for at least 30 minutes. Return in about 6 weeks (around 2023) for 6 week recheck 15m. Chief Complaint:     Chief Complaint   Patient presents with   • Physical Exam   • Edema     Feet & Hands over the last x 1 mon      History of Present Illness:     Adult Annual Physical   Patient here for a comprehensive physical exam. The patient reports problems - see below . Feet and hands swollen in the morning. Stiff. Painful. This will last a few days. Experienced this during puberty as well. Currently both sides. When she was younger it was localized to 1 side. Neck and shoulder stiffness. Struggles to get gloves on due to swelling and pain. Diet and Physical Activity  Diet/Nutrition:  Processed foods. Frozen foods. Cost prohibitive  . Exercise: walking.       Depression Screening  PHQ-2/9 Depression Screening    Little interest or pleasure in doing things: 0 - not at all  Feeling down, depressed, or hopeless: 0 - not at all  Trouble falling or staying asleep, or sleeping too much: 0 - not at all  Feeling tired or having little energy: 0 - not at all  Poor appetite or overeatin - not at all  Feeling bad about yourself - or that you are a failure or have let yourself or your family down: 0 - not at all  Trouble concentrating on things, such as reading the newspaper or watching television: 0 - not at all  Moving or speaking so slowly that other people could have noticed. Or the opposite - being so fidgety or restless that you have been moving around a lot more than usual: 0 - not at all  Thoughts that you would be better off dead, or of hurting yourself in some way: 0 - not at all  PHQ-9 Score: 0   PHQ-9 Interpretation: No or Minimal depression        General Health  Sleep: sleeps poorly and gets 4-6 hours of sleep on average. Hearing: normal - bilateral.  Vision: goes for regular eye exams and wears glasses. Dental: no dental visits for >1 year and brushes teeth twice daily. /GYN Health  Last menstrual period: Regular periods. Review of Systems:     Review of Systems   Constitutional:  Positive for fatigue. Negative for fever. Hair loss      HENT:  Negative for sore throat. Eyes:  Negative for visual disturbance. Respiratory:  Negative for cough, chest tightness and shortness of breath. Cardiovascular:  Negative for chest pain, palpitations and leg swelling. Gastrointestinal:  Negative for abdominal pain, constipation, diarrhea and nausea. Endocrine: Positive for cold intolerance. Negative for heat intolerance. Genitourinary:  Negative for flank pain. Musculoskeletal:  Positive for arthralgias and myalgias. Negative for back pain and neck pain. Pain and swelling hands/feet  Painful extremities when exposed to cold temperatures    Skin:  Negative for rash. Neurological:  Negative for headaches. Psychiatric/Behavioral:  Negative for behavioral problems and confusion. Past Medical History:     Past Medical History:   Diagnosis Date   • Disease of thyroid gland       Past Surgical History:     History reviewed. No pertinent surgical history.    Social History:     Social History     Socioeconomic History   • Marital status:      Spouse name: None   • Number of children: None   • Years of education: None   • Highest education level: None   Occupational History   • None   Tobacco Use   • Smoking status: Never     Passive exposure: Past (occassionally, not daily)   • Smokeless tobacco: None   Vaping Use   • Vaping Use: Never used   Substance and Sexual Activity   • Alcohol use: Yes     Comment: socially   • Drug use: Never   • Sexual activity: Yes     Partners: Female   Other Topics Concern   • None   Social History Narrative   • None     Social Determinants of Health     Financial Resource Strain: Not on file   Food Insecurity: Not on file   Transportation Needs: Not on file   Physical Activity: Not on file   Stress: Not on file   Social Connections: Not on file   Intimate Partner Violence: Not on file   Housing Stability: Not on file      Family History:     Family History   Problem Relation Age of Onset   • Hyperlipidemia Mother    • Hypertension Mother    • Hyperlipidemia Father    • Hypertension Father    • Heart disease Maternal Grandmother    • Diabetes Maternal Grandmother    • Stomach cancer Maternal Grandmother    • Heart disease Maternal Grandfather    • Diabetes Maternal Grandfather    • Heart disease Paternal Grandmother    • Diabetes Paternal Grandmother    • Heart disease Paternal Grandfather    • Diabetes Paternal Grandfather       Current Medications:     Current Outpatient Medications   Medication Sig Dispense Refill   • Cholecalciferol 2000 UNITS CAPS Take by mouth     • doxylamine (UNISOM) 25 MG tablet Take 1 tablet (25 mg total) by mouth daily at bedtime as needed for sleep 30 tablet 0   • FLUoxetine (PROzac) 20 mg capsule Take 1 capsule (20 mg total) by mouth daily 90 capsule 3   • fluticasone (FLONASE) 50 mcg/act nasal spray 1 spray into each nostril daily 9.9 mL 0   • hydrocortisone (ANUSOL-HC) 2.5 % rectal cream Apply topically 2 (two) times a day 28 g 0   • Magnesium 500 MG CAPS Take 500 mg by mouth     • omeprazole (PriLOSEC) 20 mg delayed release capsule Take 1 capsule (20 mg total) by mouth daily 30 capsule 0   • METHIMAZOLE PO Take 5 mg by mouth. (Patient not taking: Reported on 3/22/2023)       No current facility-administered medications for this visit. Allergies: Allergies   Allergen Reactions   • Pollen Extract Other (See Comments)   • Other Rash      Physical Exam:     /70 (BP Location: Left arm, Patient Position: Sitting, Cuff Size: Standard)   Pulse 73   Temp 97.9 °F (36.6 °C)   Resp 16   Ht 5' 5.6" (1.666 m)   Wt 76.2 kg (168 lb)   SpO2 98%   BMI 27.45 kg/m²     Physical Exam  Vitals and nursing note reviewed. Constitutional:       Appearance: Normal appearance. She is well-developed. She is not ill-appearing. HENT:      Head: Normocephalic and atraumatic. Eyes:      Extraocular Movements: Extraocular movements intact. Pupils: Pupils are equal, round, and reactive to light. Cardiovascular:      Rate and Rhythm: Normal rate and regular rhythm. Pulmonary:      Effort: Pulmonary effort is normal.      Breath sounds: Normal breath sounds. Abdominal:      General: Bowel sounds are normal.      Palpations: Abdomen is soft. Musculoskeletal:         General: Swelling and tenderness present. Cervical back: Normal range of motion. Skin:     General: Skin is warm and dry. Neurological:      General: No focal deficit present. Mental Status: She is alert and oriented to person, place, and time.    Psychiatric:         Mood and Affect: Mood normal.         Speech: Speech normal.         Behavior: Behavior normal.          Trey Montanez MD   [address]

## 2023-10-19 LAB — RHEUMATOID FACT SER QL LA: NEGATIVE

## 2023-10-20 LAB — CCP AB SER IA-ACNC: 1.4

## 2023-11-02 DIAGNOSIS — D50.9 IRON DEFICIENCY ANEMIA, UNSPECIFIED IRON DEFICIENCY ANEMIA TYPE: Primary | ICD-10-CM

## 2023-11-02 RX ORDER — FERROUS SULFATE 324(65)MG
324 TABLET, DELAYED RELEASE (ENTERIC COATED) ORAL
Qty: 90 TABLET | Refills: 0 | Status: SHIPPED | OUTPATIENT
Start: 2023-11-02

## 2023-11-29 ENCOUNTER — OFFICE VISIT (OUTPATIENT)
Dept: FAMILY MEDICINE CLINIC | Facility: CLINIC | Age: 33
End: 2023-11-29
Payer: COMMERCIAL

## 2023-11-29 VITALS
HEIGHT: 66 IN | WEIGHT: 165 LBS | HEART RATE: 82 BPM | RESPIRATION RATE: 16 BRPM | BODY MASS INDEX: 26.52 KG/M2 | SYSTOLIC BLOOD PRESSURE: 114 MMHG | TEMPERATURE: 97.5 F | DIASTOLIC BLOOD PRESSURE: 74 MMHG | OXYGEN SATURATION: 96 %

## 2023-11-29 DIAGNOSIS — M25.542 ARTHRALGIA OF BOTH HANDS: ICD-10-CM

## 2023-11-29 DIAGNOSIS — R10.13 DYSPEPSIA: ICD-10-CM

## 2023-11-29 DIAGNOSIS — Z83.2 FAMILY HISTORY OF AUTOIMMUNE DISORDER: ICD-10-CM

## 2023-11-29 DIAGNOSIS — R79.0 LOW FERRITIN: ICD-10-CM

## 2023-11-29 DIAGNOSIS — F32.2 SEVERE DEPRESSION (HCC): Primary | ICD-10-CM

## 2023-11-29 DIAGNOSIS — D69.6 THROMBOCYTOPENIA (HCC): ICD-10-CM

## 2023-11-29 DIAGNOSIS — M25.541 ARTHRALGIA OF BOTH HANDS: ICD-10-CM

## 2023-11-29 PROCEDURE — 99215 OFFICE O/P EST HI 40 MIN: CPT | Performed by: FAMILY MEDICINE

## 2023-11-29 RX ORDER — MELOXICAM 15 MG/1
15 TABLET ORAL DAILY PRN
Qty: 30 TABLET | Refills: 0 | Status: SHIPPED | OUTPATIENT
Start: 2023-11-29

## 2023-11-29 NOTE — PROGRESS NOTES
Name: Jhoan Weeks      : 1990      MRN: [id number]  Encounter Provider: Trish Kamara MD  Encounter Date: 2023   Encounter department: [address] [place] [address]     1. Severe depression ([address])  Assessment & Plan:  Continue prozac 20mg       2. Dyspepsia  Assessment & Plan:  Continue omeprazole 20 mg daily      3. Thrombocytopenia (HCC)  -     Comprehensive metabolic panel; Future; Expected date: 2023  -     CBC and differential; Future; Expected date: 2023  -     Peripheral Smear; Future  -     CK; Future    4. Low ferritin  -     Iron Panel (Includes Ferritin, Iron Sat%, Iron, and TIBC); Future; Expected date: 2023    5. Arthralgia of both hands  -     Sickle cell screen; Future  -     Ambulatory Referral to Rheumatology; Future  -     Sedimentation rate, automated; Future  -     C-reactive protein; Future  -     CK; Future  -     meloxicam (MOBIC) 15 mg tablet; Take 1 tablet (15 mg total) by mouth daily as needed for moderate pain    6. Family history of autoimmune disorder  -     Ambulatory Referral to Rheumatology; Future       Dactylitis-patient will update pictures in my chart  LE swelling and pain   Fried foods trigger the stomach. Acid reflux medication is helping. She has mild thrombocytopenia with elevated ESR and see eosinophil count. This could be an autoimmune pathology however her MAKENNA and RF are negative. She does have a family history of autoimmune disease. Her half sister has lupus. Going to have her repeat lab work listed above and make referral to rheumatology.         I have spent a total time of 45 minutes on 23 in caring for this patient including Diagnostic results, Prognosis, Risks and benefits of tx options, Instructions for management, Patient and family education, Importance of tx compliance, Risk factor reductions, Impressions, Counseling / Coordination of care, Documenting in the medical record, Reviewing / ordering tests, medicine, procedures  , and Obtaining or reviewing history  . Subjective      Patient presents today for follow up. Recently increased prozac to 20 mg. Tolerating well    She continues to have swelling and pain in her joints. Specifically her hands and lower extremities. She describes her hands as sausage fingers which become difficult to bend. She did obtain lab work recently which showed a slightly elevated platelet count, eosinophil count and ESR. MAKENNA has been negative. She is taking Aleve twice daily for this pain. Her ferritin was low normal.  She is not taking any iron at this time. She has a family history of autoimmune disease. Denies any skin changes. She says her joint pain fluctuates. She does not take Aleve she moves like a robot. Review of Systems   Constitutional:  Negative for fatigue and fever. HENT:  Negative for sore throat. Eyes:  Negative for visual disturbance. Respiratory:  Negative for cough, chest tightness and shortness of breath. Cardiovascular:  Negative for chest pain, palpitations and leg swelling. Gastrointestinal:  Negative for abdominal pain, constipation, diarrhea and nausea. Endocrine: Negative for cold intolerance and heat intolerance. Genitourinary:  Negative for flank pain. Musculoskeletal:  Positive for arthralgias. Negative for back pain and neck pain. Hand swelling and pain   Leg swelling and pain      Skin:  Negative for rash. Neurological:  Negative for headaches. Psychiatric/Behavioral:  Positive for dysphoric mood. Negative for behavioral problems and confusion.         Current Outpatient Medications on File Prior to Visit   Medication Sig   • Cholecalciferol 2000 UNITS CAPS Take by mouth   • doxylamine (UNISOM) 25 MG tablet Take 1 tablet (25 mg total) by mouth daily at bedtime as needed for sleep   • ferrous sulfate 324 (65 Fe) mg Take 1 tablet (324 mg total) by mouth daily before breakfast   • FLUoxetine (PROzac) 20 mg capsule Take 1 capsule (20 mg total) by mouth daily   • fluticasone (FLONASE) 50 mcg/act nasal spray 1 spray into each nostril daily   • hydrocortisone (ANUSOL-HC) 2.5 % rectal cream Apply topically 2 (two) times a day   • Magnesium 500 MG CAPS Take 500 mg by mouth   • METHIMAZOLE PO Take 5 mg by mouth   • omeprazole (PriLOSEC) 20 mg delayed release capsule Take 1 capsule (20 mg total) by mouth daily       Objective     /74 (BP Location: Left arm, Patient Position: Sitting, Cuff Size: Standard)   Pulse 82   Temp 97.5 °F (36.4 °C) (Tympanic)   Resp 16   Ht 5' 5.6" (1.666 m)   Wt 74.8 kg (165 lb)   SpO2 96%   BMI 26.96 kg/m²     Physical Exam  Vitals and nursing note reviewed. Constitutional:       Appearance: She is well-developed. HENT:      Head: Normocephalic and atraumatic. Cardiovascular:      Rate and Rhythm: Normal rate and regular rhythm. Pulmonary:      Effort: Pulmonary effort is normal.      Breath sounds: Normal breath sounds. Musculoskeletal:         General: Swelling and tenderness present. Skin:     General: Skin is warm. Findings: No erythema, lesion or rash. Neurological:      General: No focal deficit present. Mental Status: She is alert and oriented to person, place, and time.    Psychiatric:         Mood and Affect: Mood normal.         Behavior: Behavior normal.       Chris Clemente MD

## 2023-11-30 ENCOUNTER — HOSPITAL ENCOUNTER (OUTPATIENT)
Dept: RADIOLOGY | Facility: HOSPITAL | Age: 33
Discharge: HOME | End: 2023-11-30
Payer: COMMERCIAL

## 2023-11-30 ENCOUNTER — APPOINTMENT (OUTPATIENT)
Dept: LAB | Facility: CLINIC | Age: 33
End: 2023-11-30
Payer: COMMERCIAL

## 2023-11-30 ENCOUNTER — OFFICE VISIT (OUTPATIENT)
Dept: RHEUMATOLOGY | Facility: CLINIC | Age: 33
End: 2023-11-30
Payer: COMMERCIAL

## 2023-11-30 VITALS
BODY MASS INDEX: 26.42 KG/M2 | HEART RATE: 74 BPM | DIASTOLIC BLOOD PRESSURE: 62 MMHG | HEIGHT: 66 IN | OXYGEN SATURATION: 98 % | SYSTOLIC BLOOD PRESSURE: 112 MMHG | WEIGHT: 164.4 LBS

## 2023-11-30 DIAGNOSIS — R79.0 LOW FERRITIN: ICD-10-CM

## 2023-11-30 DIAGNOSIS — M25.542 ARTHRALGIA OF BOTH HANDS: ICD-10-CM

## 2023-11-30 DIAGNOSIS — T78.3XXS ANGIOEDEMA, SEQUELA: ICD-10-CM

## 2023-11-30 DIAGNOSIS — L50.9 URTICARIA: ICD-10-CM

## 2023-11-30 DIAGNOSIS — M25.50 ARTHRALGIA, UNSPECIFIED JOINT: ICD-10-CM

## 2023-11-30 DIAGNOSIS — D69.6 THROMBOCYTOPENIA (HCC): ICD-10-CM

## 2023-11-30 DIAGNOSIS — M25.541 ARTHRALGIA OF BOTH HANDS: ICD-10-CM

## 2023-11-30 DIAGNOSIS — Z83.2 FAMILY HISTORY OF AUTOIMMUNE DISORDER: ICD-10-CM

## 2023-11-30 DIAGNOSIS — L50.9 URTICARIA: Primary | ICD-10-CM

## 2023-11-30 LAB
CK SERPL-CCNC: 42 U/L (ref 26–192)
CRP SERPL QL: 1.1 MG/L
ERYTHROCYTE [SEDIMENTATION RATE] IN BLOOD: 28 MM/HOUR (ref 0–19)
IMM EOSINOPHIL NFR BLD MANUAL: 4 % (ref 0–6)
LYMPHOCYTES NFR BLD: 33 % (ref 14–44)
MONOCYTES NFR BLD AUTO: 7 % (ref 4–12)
NEUTS BAND NFR BLD MANUAL: 0 THOUSAND/UL
NEUTS SEG NFR BLD AUTO: 56 % (ref 45–77)
PLATELET BLD QL SMEAR: ADEQUATE
RBC MORPH BLD: NORMAL
TOTAL CELLS COUNTED SPEC: 100

## 2023-11-30 PROCEDURE — 99245 OFF/OP CONSLTJ NEW/EST HI 55: CPT | Performed by: INTERNAL MEDICINE

## 2023-11-30 PROCEDURE — 73130 X-RAY EXAM OF HAND: CPT

## 2023-11-30 PROCEDURE — 82550 ASSAY OF CK (CPK): CPT

## 2023-11-30 PROCEDURE — 85652 RBC SED RATE AUTOMATED: CPT

## 2023-11-30 PROCEDURE — 85007 BL SMEAR W/DIFF WBC COUNT: CPT

## 2023-11-30 PROCEDURE — 85660 RBC SICKLE CELL TEST: CPT

## 2023-11-30 PROCEDURE — 86376 MICROSOMAL ANTIBODY EACH: CPT

## 2023-11-30 PROCEDURE — 86800 THYROGLOBULIN ANTIBODY: CPT

## 2023-11-30 PROCEDURE — 86364 TISS TRNSGLTMNASE EA IG CLAS: CPT

## 2023-11-30 PROCEDURE — 73630 X-RAY EXAM OF FOOT: CPT

## 2023-11-30 PROCEDURE — 86140 C-REACTIVE PROTEIN: CPT

## 2023-11-30 PROCEDURE — 36415 COLL VENOUS BLD VENIPUNCTURE: CPT

## 2023-11-30 PROCEDURE — 86618 LYME DISEASE ANTIBODY: CPT

## 2023-11-30 NOTE — PROGRESS NOTES
This is a Rheumatology Consult seen at the request of Dr. Isadora Heredia       HPI: Chuckie Martines is a 36 y/o female who presents for further evaluation chronic trimester. She has past medical history hyperthyroidism (currently not on medications), GERD, anxiety, depression    She describes intermittent swelling in her extremities > 20 years duration. She describes episodes of swelling that last a few days and then resolves. She has not noted any joint deformities. No prior h/o psoriasis, inflammatory bowel disease. No current rashes, Raynaud's    + sicca (on SSRI)    No prior seizures or strokes, thrombotic events or pregnancy morbidity. No prior h/o renal failure, pleural-pericardial effusion          --------------------------------------------------------------------------------------------------------        ROS:    + as above with the addition of night sweats, occasional headaches, jaw pain, dry eyes, dry mouth, occasional oral "blisters", GERD, numbness and tingling     - for: Fevers, Chills. No auditory complaints. No sore throat or cough. No chest pains or palpitations. No shortness of breath, dyspnea on exertion or wheezing. No hemotpysis. No rashes.     All other ROS was reviewed and negative except as above         --------------------------------------------------------------------------------------------------------    Past Medical History    Past Medical History:   Diagnosis Date    Abnormal TSH 06/10/2015    Disease of thyroid gland            Past Surgical History    No surgeries         Family History    Family History   Problem Relation Age of Onset    Hyperlipidemia Mother     Hypertension Mother     Hyperlipidemia Father     Hypertension Father     Heart disease Maternal Grandmother     Diabetes Maternal Grandmother     Stomach cancer Maternal Grandmother     Heart disease Maternal Grandfather     Diabetes Maternal Grandfather     Heart disease Paternal Grandmother     Diabetes Paternal Grandmother     Heart disease Paternal Grandfather     Diabetes Paternal Grandfather       Grandmother rheumatoid arthritis       Social History    Social History     Tobacco Use    Smoking status: Never     Passive exposure: Past (occassionally, not daily)    Smokeless tobacco: Never   Vaping Use    Vaping Use: Never used   Substance Use Topics    Alcohol use: Yes     Comment: socially    Drug use: Never     She works remote customer support      Allergies    Allergies   Allergen Reactions    Pollen Extract Other (See Comments)    Other Rash     Cats         Medications    Current Outpatient Medications   Medication Instructions    Cholecalciferol 2000 UNITS CAPS Oral    doxylamine (UNISOM) 25 mg, Oral, Daily at bedtime PRN    ferrous sulfate 324 mg, Oral, Daily before breakfast    FLUoxetine (PROZAC) 20 mg, Oral, Daily    fluticasone (FLONASE) 50 mcg/act nasal spray 1 spray, Nasal, Daily    hydrocortisone (ANUSOL-HC) 2.5 % rectal cream Topical, 2 times daily    Magnesium 500 mg, Oral    meloxicam (MOBIC) 15 mg, Oral, Daily PRN    METHIMAZOLE PO 5 mg    omeprazole (PRILOSEC) 20 mg, Oral, Daily          Physical Exam    /62   Pulse 74   Ht 5' 5.6" (1.666 m)   Wt 74.6 kg (164 lb 6.4 oz)   SpO2 98%   BMI 26.86 kg/m²     GEN: AAO, No apparent distress. Patient is well developed. HEENT:  Pupils are equal, round and reactive. Sclera are clear. Fundoscopic exam is normal.  External ears are without lesions. Oral pharynx is clear of ulcers or other lesions. MMM. NECK:  Supple. There is no adenopathy appreciable in anterior or posterior cervical chains or supraclavicularly. JVP is normal.    HEART: Regular rate and rhythm. There is no appreciable murmur, gallop or rub. LUNGS: Clear to auscultation. ABD:  Soft, without tenderness, rebound or guarding. No appreciable organomegally. NEURO: Speech and cognition are normal.  Strength is 5/5 throughout.   Tone is normal.  DTRs are 2/4 at the knees, ankles and elbows. Gait is normal.  SKIN: There are no rashes or lesions    MUSCULOSKELETAL:   -Hands: normal  -Wrists: normal  -Elbows: normal  -Shoulders: normal  -Neck: normal  -Back: normal  -Hips: normal  -Knees: normal  -Ankles: normal  -Feet: normal      ________________________________________________________________________          Results Review    Component      Latest Ref Rng 10/18/2023   MAKENAN      Negative  Negative    RHEUMATOID FACTOR      Negative  Negative    CYCLIC CITRULLINATED PEPTIDE ANTIBODY      See comment  1.4    C-REACTIVE PROTEIN      <3.0 mg/L 8.5 (H)    Sed Rate      0 - 19 mm/hour 19       Legend:  (H) High      Impressions and Plans:    Joseph Parra is a 36 y/o with h/o intermittent swelling > 20 years duration  On exam no overt synovitis, joint effusion, deformities etc  No other s/s inflammatory arthritis/CTD  Serologies including MAKENNA, RF, CCP checked and negative  CRP mildly elevated, ESR was normal  Check celiac screen,plain films hands and feet  Would recommend Allergy/Immunology consultation ?angioedema  Will review test results and f/u as needed      Thank you for involving me in this patient's care. Honorio Duncan MD  Department of Rheumatology  [address] [place] [address]      I have spent a total time of 64 minutes on 11/30/23 in caring for this patient including Diagnostic results, Prognosis, Impressions, Counseling / Coordination of care, Documenting in the medical record, Reviewing / ordering tests, medicine, procedures  , and Obtaining or reviewing history  .

## 2023-12-01 LAB
B BURGDOR IGG+IGM SER QL IA: NEGATIVE
SICKLE CELLS BLD QL SMEAR: NEGATIVE
THYROGLOB AB SERPL-ACNC: <1 IU/ML (ref 0–0.9)

## 2023-12-02 LAB
THYROPEROXIDASE AB SERPL-ACNC: 10 IU/ML (ref 0–34)
TTG IGA SER-ACNC: <2 U/ML (ref 0–3)

## 2023-12-28 DIAGNOSIS — M25.541 ARTHRALGIA OF BOTH HANDS: ICD-10-CM

## 2023-12-28 DIAGNOSIS — M25.542 ARTHRALGIA OF BOTH HANDS: ICD-10-CM

## 2023-12-28 RX ORDER — MELOXICAM 15 MG/1
15 TABLET ORAL DAILY PRN
Qty: 30 TABLET | Refills: 1 | Status: SHIPPED | OUTPATIENT
Start: 2023-12-28

## 2024-01-28 ENCOUNTER — HOSPITAL ENCOUNTER (EMERGENCY)
Facility: HOSPITAL | Age: 34
Discharge: HOME | End: 2024-01-29
Attending: EMERGENCY MEDICINE

## 2024-01-28 VITALS
SYSTOLIC BLOOD PRESSURE: 128 MMHG | OXYGEN SATURATION: 100 % | DIASTOLIC BLOOD PRESSURE: 78 MMHG | RESPIRATION RATE: 18 BRPM | TEMPERATURE: 98.4 F | HEART RATE: 90 BPM

## 2024-01-28 DIAGNOSIS — L02.91 ABSCESS: Primary | ICD-10-CM

## 2024-01-28 PROCEDURE — 99282 EMERGENCY DEPT VISIT SF MDM: CPT

## 2024-01-28 PROCEDURE — 10060 I&D ABSCESS SIMPLE/SINGLE: CPT | Performed by: EMERGENCY MEDICINE

## 2024-01-28 PROCEDURE — 99284 EMERGENCY DEPT VISIT MOD MDM: CPT | Performed by: EMERGENCY MEDICINE

## 2024-01-28 RX ORDER — LIDOCAINE HYDROCHLORIDE AND EPINEPHRINE 10; 10 MG/ML; UG/ML
10 INJECTION, SOLUTION INFILTRATION; PERINEURAL ONCE
Status: COMPLETED | OUTPATIENT
Start: 2024-01-28 | End: 2024-01-28

## 2024-01-28 RX ORDER — OXYCODONE HYDROCHLORIDE 5 MG/1
5 TABLET ORAL ONCE
Status: COMPLETED | OUTPATIENT
Start: 2024-01-28 | End: 2024-01-28

## 2024-01-28 RX ORDER — IBUPROFEN 600 MG/1
600 TABLET ORAL ONCE
Status: COMPLETED | OUTPATIENT
Start: 2024-01-28 | End: 2024-01-28

## 2024-01-28 RX ORDER — ACETAMINOPHEN 325 MG/1
975 TABLET ORAL ONCE
Status: COMPLETED | OUTPATIENT
Start: 2024-01-28 | End: 2024-01-28

## 2024-01-28 RX ADMIN — ACETAMINOPHEN 975 MG: 325 TABLET, FILM COATED ORAL at 23:15

## 2024-01-28 RX ADMIN — OXYCODONE HYDROCHLORIDE 5 MG: 5 TABLET ORAL at 23:16

## 2024-01-28 RX ADMIN — IBUPROFEN 600 MG: 600 TABLET, FILM COATED ORAL at 23:16

## 2024-01-28 RX ADMIN — LIDOCAINE HYDROCHLORIDE,EPINEPHRINE BITARTRATE 10 ML: 10; .01 INJECTION, SOLUTION INFILTRATION; PERINEURAL at 23:17

## 2024-01-28 NOTE — Clinical Note
Sandy Mendez was seen and treated in our emergency department on 1/28/2024.                Diagnosis:     Sandy  .    She may return on this date: 01/31/2024         If you have any questions or concerns, please don't hesitate to call.      Delgado Coffey, DO    ______________________________           _______________          _______________  Hospital Representative                              Date                                Time

## 2024-01-29 LAB
EXT PREGNANCY TEST URINE: NEGATIVE
EXT. CONTROL: NORMAL

## 2024-01-29 PROCEDURE — 87070 CULTURE OTHR SPECIMN AEROBIC: CPT

## 2024-01-29 PROCEDURE — 87205 SMEAR GRAM STAIN: CPT

## 2024-01-29 PROCEDURE — 81025 URINE PREGNANCY TEST: CPT

## 2024-01-29 RX ORDER — DOXYCYCLINE HYCLATE 100 MG/1
100 CAPSULE ORAL 2 TIMES DAILY
Qty: 14 CAPSULE | Refills: 0 | Status: SHIPPED | OUTPATIENT
Start: 2024-01-29 | End: 2024-02-05

## 2024-01-29 RX ORDER — DOXYCYCLINE HYCLATE 100 MG/1
100 CAPSULE ORAL ONCE
Status: COMPLETED | OUTPATIENT
Start: 2024-01-29 | End: 2024-01-29

## 2024-01-29 RX ADMIN — DOXYCYCLINE 100 MG: 100 CAPSULE ORAL at 00:23

## 2024-01-29 NOTE — DISCHARGE INSTRUCTIONS
Return to the ED in 48 hours to have your wound checked and have the packing removed.  I would also consider following up with general surgery and I made a referral for you, take the antibiotics as prescribed, return to ED for any new or worsening symptoms or concerns.

## 2024-01-29 NOTE — ED ATTENDING ATTESTATION
1/28/2024  I, Larry Champagne MD, saw and evaluated the patient. I have discussed the patient with the resident/non-physician practitioner and agree with the resident's/non-physician practitioner's findings, Plan of Care, and MDM as documented in the resident's/non-physician practitioner's note, except where noted. All available labs and Radiology studies were reviewed.  I was present for key portions of any procedure(s) performed by the resident/non-physician practitioner and I was immediately available to provide assistance.       At this point I agree with the current assessment done in the Emergency Department.  I have conducted an independent evaluation of this patient a history and physical is as follows: Patient is a 33 year old female with increased swelling and pain and redness of cyst of R axilla for about 1 week. No trauma. No fever. No N/V. Has had numbness of R arm. Was last seen at  Rheumatology on 11/30/23 for urticaria. [facility] website checked on this patient and no Rx found. States last Td was 1 year ago. NCAT. Moist mucous membranes. Lungs clear. Heart regular without murmur. (+) large erythematous tenderness R axilla abscess with ?minimal surrounding erythema. DDX including but not limited to: Abscess, cellulitis, folliculitis, carbuncle; doubt osteomyelitis, lymphangitis, rhabdomyolysis, necrotizing fasciitis, allergic reaction. Will I&D abscess and give abx.     ED Course         Critical Care Time  Procedures

## 2024-01-29 NOTE — ED PROVIDER NOTES
History  Chief Complaint   Patient presents with    Cyst     Pt states she has a cyst that has been growing in size under her L armpit since Monday. Reports that she is now unable to lower her arm d/t the pain. Pt denies current fevers, N/V, or other signs of distress.     Sandy is a 33 year old female presenting for evaluation of a cyst/abscess in her right armpit.  Patient states that she began with a small red bump under her right armpit 6 days ago, she knew that she had an ingrown hair at that time.  It has since grown significantly in size and is very tender.  She denies any history of prior abscesses/cyst in the region.  She denies any spreading of redness at the site.  No fevers or constitutional symptoms.  Patient is up-to-date on her tetanus.      History provided by:  Patient   used: No        Prior to Admission Medications   Prescriptions Last Dose Informant Patient Reported? Taking?   Cholecalciferol 2000 UNITS CAPS  Self Yes No   Sig: Take by mouth   FLUoxetine (PROzac) 20 mg capsule  Self No No   Sig: Take 1 capsule (20 mg total) by mouth daily   METHIMAZOLE PO  Self Yes No   Sig: Take 5 mg by mouth   Patient not taking: Reported on 2023   Magnesium 500 MG CAPS  Self Yes No   Sig: Take 500 mg by mouth   doxylamine (UNISOM) 25 MG tablet  Self No No   Sig: Take 1 tablet (25 mg total) by mouth daily at bedtime as needed for sleep   Patient not taking: Reported on 2023   ferrous sulfate 324 (65 Fe) mg  Self No No   Sig: Take 1 tablet (324 mg total) by mouth daily before breakfast   fluticasone (FLONASE) 50 mcg/act nasal spray  Self No No   Si spray into each nostril daily   hydrocortisone (ANUSOL-HC) 2.5 % rectal cream  Self No No   Sig: Apply topically 2 (two) times a day   Patient taking differently: Apply topically if needed   meloxicam (MOBIC) 15 mg tablet   No No   Sig: TAKE 1 TABLET BY MOUTH DAILY AS NEEDED FOR MODERATE PAIN.   omeprazole (PriLOSEC) 20 mg delayed  release capsule  Self No No   Sig: Take 1 capsule (20 mg total) by mouth daily      Facility-Administered Medications: None       Past Medical History:   Diagnosis Date    Abnormal TSH 06/10/2015    Disease of thyroid gland        History reviewed. No pertinent surgical history.    Family History   Problem Relation Age of Onset    Hyperlipidemia Mother     Hypertension Mother     Hyperlipidemia Father     Hypertension Father     Heart disease Maternal Grandmother     Diabetes Maternal Grandmother     Stomach cancer Maternal Grandmother     Heart disease Maternal Grandfather     Diabetes Maternal Grandfather     Heart disease Paternal Grandmother     Diabetes Paternal Grandmother     Heart disease Paternal Grandfather     Diabetes Paternal Grandfather      I have reviewed and agree with the history as documented.    E-Cigarette/Vaping    E-Cigarette Use Never User      E-Cigarette/Vaping Substances    Nicotine No     THC No     CBD No     Flavoring No      Social History     Tobacco Use    Smoking status: Never     Passive exposure: Past (occassionally, not daily)    Smokeless tobacco: Never   Vaping Use    Vaping status: Never Used   Substance Use Topics    Alcohol use: Yes     Comment: socially    Drug use: Never        Review of Systems   Constitutional:  Negative for chills and fever.   HENT:  Negative for ear pain and sore throat.    Eyes:  Negative for pain and visual disturbance.   Respiratory:  Negative for cough and shortness of breath.    Cardiovascular:  Negative for chest pain and palpitations.   Gastrointestinal:  Negative for abdominal pain and vomiting.   Genitourinary:  Negative for dysuria and hematuria.   Musculoskeletal:  Negative for arthralgias and back pain.   Skin:  Positive for wound. Negative for color change and rash.        Abscess/cyst right armpit   Neurological:  Negative for seizures and syncope.   All other systems reviewed and are negative.      Physical Exam  ED Triage Vitals  [01/28/24 2250]   Temperature Pulse Respirations Blood Pressure SpO2   98.4 °F (36.9 °C) 90 18 128/78 100 %      Temp Source Heart Rate Source Patient Position - Orthostatic VS BP Location FiO2 (%)   Oral Monitor Lying Right arm --      Pain Score       --             Orthostatic Vital Signs  Vitals:    01/28/24 2250   BP: 128/78   Pulse: 90   Patient Position - Orthostatic VS: Lying       Physical Exam  Vitals and nursing note reviewed.   Constitutional:       General: She is in acute distress.      Comments: Mild distress secondary to pain   HENT:      Head: Normocephalic and atraumatic.      Mouth/Throat:      Mouth: Mucous membranes are moist.      Pharynx: Oropharynx is clear.   Eyes:      General: No scleral icterus.     Conjunctiva/sclera: Conjunctivae normal.   Cardiovascular:      Rate and Rhythm: Normal rate and regular rhythm.      Heart sounds: Normal heart sounds.   Pulmonary:      Effort: Pulmonary effort is normal. No respiratory distress.      Breath sounds: Normal breath sounds.   Abdominal:      General: Abdomen is flat. There is no distension.      Tenderness: There is no abdominal tenderness.   Musculoskeletal:         General: No tenderness or signs of injury.      Cervical back: Neck supple. No rigidity.   Skin:     General: Skin is warm.      Coloration: Skin is not jaundiced.      Findings: Abscess present. No rash.      Comments: Patient has a large size abscess to the right axilla, approximately 2.5 cm x 2.5 cm, it is not open or draining, there is no surrounding erythema that would be concerning for a overlying cellulitis.  The area is exquisitely tender to palpation.   Neurological:      General: No focal deficit present.      Mental Status: She is alert. Mental status is at baseline.   Psychiatric:         Mood and Affect: Mood normal.         Behavior: Behavior normal.         ED Medications  Medications   doxycycline hyclate (VIBRAMYCIN) capsule 100 mg (has no administration in time  "range)   acetaminophen (TYLENOL) tablet 975 mg (975 mg Oral Given 1/28/24 2315)   ibuprofen (MOTRIN) tablet 600 mg (600 mg Oral Given 1/28/24 2316)   lidocaine-epinephrine (XYLOCAINE/EPINEPHRINE) 1 %-1:100,000 injection 10 mL (10 mL Infiltration Given 1/28/24 2317)   oxyCODONE (ROXICODONE) IR tablet 5 mg (5 mg Oral Given 1/28/24 2316)       Diagnostic Studies  Results Reviewed       Procedure Component Value Units Date/Time    POCT pregnancy, urine [[id number]]     Lab Status: No result     Wound culture and Gram stain [[id number]]     Lab Status: No result Specimen: Wound                    No orders to display         Procedures  Incision and drain    Date/Time: 1/29/2024 12:03 AM    Performed by: Delgado Coffey DO  Authorized by: Delgado Coffey DO  Universal Protocol:  Consent: Verbal consent obtained.  Risks and benefits: risks, benefits and alternatives were discussed  Consent given by: patient  Time out: Immediately prior to procedure a \"time out\" was called to verify the correct patient, procedure, equipment, support staff and site/side marked as required.  Patient understanding: patient states understanding of the procedure being performed  Patient consent: the patient's understanding of the procedure matches consent given  Procedure consent: procedure consent matches procedure scheduled  Relevant documents: relevant documents present and verified  Test results: test results available and properly labeled  Site marked: the operative site was marked  Radiology Images displayed and confirmed. If images not available, report reviewed: imaging studies available  Required items: required blood products, implants, devices, and special equipment available  Patient identity confirmed: verbally with patient and arm band    Patient location:  ED  Location:     Type:  Abscess    Location: Right armpit.  Pre-procedure details:     Skin preparation:  Betadine  Anesthesia (see MAR for exact dosages): "     Anesthesia method:  Local infiltration    Local anesthetic:  Lidocaine 1% WITH epi  Procedure details:     Complexity:  Simple    Incision types:  Single straight    Scalpel blade:  11    Approach:  Open    Incision depth:  Subcutaneous    Wound management:  Probed and deloculated, irrigated with saline and extensive cleaning    Drainage:  Bloody and purulent    Drainage amount:  Copious    Wound treatment:  Wound left open    Packing materials:  1/2 in gauze  Post-procedure details:     Patient tolerance of procedure:  Tolerated well, no immediate complications        ED Course                             SBIRT 20yo+      Flowsheet Row Most Recent Value   Initial Alcohol Screen: US AUDIT-C     1. How often do you have a drink containing alcohol? 0 Filed at: 01/28/2024 2258   2. How many drinks containing alcohol do you have on a typical day you are drinking?  0 Filed at: 01/28/2024 2258   3a. Male UNDER 65: How often do you have five or more drinks on one occasion? 0 Filed at: 01/28/2024 2258   3b. FEMALE Any Age, or MALE 65+: How often do you have 4 or more drinks on one occassion? 0 Filed at: 01/28/2024 2258   Audit-C Score 0 Filed at: 01/28/2024 2258   JOAN: How many times in the past year have you...    Used an illegal drug or used a prescription medication for non-medical reasons? Never Filed at: 01/28/2024 2258                  Medical Decision Making  Sandy is a 33 year old female presenting for evaluation of a cyst/abscess in her right armpit.  Patient states that the lesion began 6 days ago and has grown in size, denies any spreading redness, no constitutional symptoms.  No history of prior abscesses/cysts in this region.    She had normal vital signs, afebrile.  On exam she had a large abscess in her right axilla, there is no surrounding erythema or findings concerning for cellulitis.  She was severely tender in this region.    Patient was agreeable to incision and drainage, she was given ibuprofen,  Tylenol, oxycodone prior to performing the procedure.  The skin was sent home with lidocaine with epinephrine.  A large amount of bloody/purulent fluid was expressed, the area was deloculated, it was also cleaned with saline injected into the abscess site.  Packing was placed at the site and a dressing was applied.  Patient tolerated the procedure well.  She was given a dose of doxycycline here in the ED and a prescription was sent to her pharmacy, advised to take medication as prescribed, advised that she return in 48 hours to have the packing removed and assessed as she did describe difficulty obtaining outpatient follow-up.  I did however still make a referral to the general surgery team and advised her to follow-up with her team and/or her PCP, she was understanding and agreeable to plan, strict return precautions given.    Problems Addressed:  Abscess: acute illness or injury    Amount and/or Complexity of Data Reviewed  Labs: ordered.    Risk  OTC drugs.  Prescription drug management.          Disposition  Final diagnoses:   Abscess     Time reflects when diagnosis was documented in both MDM as applicable and the Disposition within this note       Time User Action Codes Description Comment    1/29/2024 12:06 AM Delgado Coffey Add [L02.91] Abscess           ED Disposition       ED Disposition   Discharge    Condition   Stable    Date/Time   Mon Jan 29, 2024 0007    Comment   Sandy Mendez discharge to home/self care.                   Follow-up Information       Follow up With Specialties Details Why Contact Info Additional Information    [facility] General Surgery Go in 2 days  [address]  [facility] [address] [phone] [facility], [address], [place], [address]    Medina Palm MD Family Medicine   [address] [facility] [id number] [phone]       [facility] Emergency  Department Emergency Medicine   [address] [facility] [address]  [phone] [facility] Emergency Department, [address], [place], [address]            Patient's Medications   Discharge Prescriptions    DOXYCYCLINE HYCLATE (VIBRAMYCIN) 100 MG CAPSULE    Take 1 capsule (100 mg total) by mouth 2 (two) times a day for 7 days       Start Date: 1/29/2024 End Date: 2/5/2024       Order Dose: 100 mg       Quantity: 14 capsule    Refills: 0         PDMP Review         Value Time User    PDMP Reviewed  Yes 1/28/2024 10:40 PM Larry Champagne MD             ED Provider  Attending physically available and evaluated Sandy Mendez. I managed the patient along with the ED Attending.    Electronically Signed by           Delgado Coffey DO  01/29/24 0153

## 2024-01-30 ENCOUNTER — HOSPITAL ENCOUNTER (EMERGENCY)
Facility: HOSPITAL | Age: 34
Discharge: HOME | End: 2024-01-30
Attending: EMERGENCY MEDICINE

## 2024-01-30 VITALS
OXYGEN SATURATION: 99 % | TEMPERATURE: 98.1 F | RESPIRATION RATE: 20 BRPM | SYSTOLIC BLOOD PRESSURE: 115 MMHG | DIASTOLIC BLOOD PRESSURE: 74 MMHG | HEART RATE: 87 BPM

## 2024-01-30 DIAGNOSIS — Z48.00 ABSCESS PACKING REMOVAL: Primary | ICD-10-CM

## 2024-01-30 DIAGNOSIS — Z51.89 VISIT FOR WOUND CHECK: ICD-10-CM

## 2024-01-30 PROCEDURE — 99024 POSTOP FOLLOW-UP VISIT: CPT | Performed by: EMERGENCY MEDICINE

## 2024-01-30 PROCEDURE — 99282 EMERGENCY DEPT VISIT SF MDM: CPT

## 2024-01-30 NOTE — ED PROVIDER NOTES
History  Chief Complaint   Patient presents with    Wound Check     Had cyst drained and packed 2 days ago under R armpit and was told to come here for follow up. C/o pain         33-year-old female, had abscess in right axilla drained here for packing removal and wound recheck, says she has been having pain over the area.  But has been improved since the incision and drainage no fevers no chills otherwise normal state of health      Wound Check         Prior to Admission Medications   Prescriptions Last Dose Informant Patient Reported? Taking?   Cholecalciferol 2000 UNITS CAPS  Self Yes No   Sig: Take by mouth   FLUoxetine (PROzac) 20 mg capsule  Self No No   Sig: Take 1 capsule (20 mg total) by mouth daily   METHIMAZOLE PO  Self Yes No   Sig: Take 5 mg by mouth   Patient not taking: Reported on 2023   Magnesium 500 MG CAPS  Self Yes No   Sig: Take 500 mg by mouth   doxycycline hyclate (VIBRAMYCIN) 100 mg capsule   No No   Sig: Take 1 capsule (100 mg total) by mouth 2 (two) times a day for 7 days   doxylamine (UNISOM) 25 MG tablet  Self No No   Sig: Take 1 tablet (25 mg total) by mouth daily at bedtime as needed for sleep   Patient not taking: Reported on 2023   ferrous sulfate 324 (65 Fe) mg  Self No No   Sig: Take 1 tablet (324 mg total) by mouth daily before breakfast   fluticasone (FLONASE) 50 mcg/act nasal spray  Self No No   Si spray into each nostril daily   hydrocortisone (ANUSOL-HC) 2.5 % rectal cream  Self No No   Sig: Apply topically 2 (two) times a day   Patient taking differently: Apply topically if needed   meloxicam (MOBIC) 15 mg tablet   No No   Sig: TAKE 1 TABLET BY MOUTH DAILY AS NEEDED FOR MODERATE PAIN.   omeprazole (PriLOSEC) 20 mg delayed release capsule  Self No No   Sig: Take 1 capsule (20 mg total) by mouth daily      Facility-Administered Medications: None       Past Medical History:   Diagnosis Date    Abnormal TSH 06/10/2015    Disease of thyroid gland        History  reviewed. No pertinent surgical history.    Family History   Problem Relation Age of Onset    Hyperlipidemia Mother     Hypertension Mother     Hyperlipidemia Father     Hypertension Father     Heart disease Maternal Grandmother     Diabetes Maternal Grandmother     Stomach cancer Maternal Grandmother     Heart disease Maternal Grandfather     Diabetes Maternal Grandfather     Heart disease Paternal Grandmother     Diabetes Paternal Grandmother     Heart disease Paternal Grandfather     Diabetes Paternal Grandfather      I have reviewed and agree with the history as documented.    E-Cigarette/Vaping    E-Cigarette Use Never User      E-Cigarette/Vaping Substances    Nicotine No     THC No     CBD No     Flavoring No      Social History     Tobacco Use    Smoking status: Never     Passive exposure: Past (occassionally, not daily)    Smokeless tobacco: Never   Vaping Use    Vaping status: Never Used   Substance Use Topics    Alcohol use: Yes     Comment: socially    Drug use: Never       Review of Systems   Constitutional:  Negative for fever.   Respiratory:  Negative for chest tightness and shortness of breath.    Cardiovascular:  Negative for chest pain.   Skin:  Negative for rash.   Neurological:  Negative for dizziness, light-headedness and headaches.       Physical Exam  Physical Exam  Vitals reviewed.   Constitutional:       General: She is not in acute distress.     Appearance: She is well-developed.   HENT:      Head: Atraumatic.      Nose: Nose normal.   Eyes:      General: No scleral icterus.        Right eye: No discharge.         Left eye: No discharge.      Conjunctiva/sclera: Conjunctivae normal.   Neck:      Trachea: No tracheal deviation.   Pulmonary:      Effort: Pulmonary effort is normal. No respiratory distress.      Breath sounds: No stridor.   Musculoskeletal:         General: No deformity.      Cervical back: Normal range of motion.   Skin:     General: Skin is warm and dry.      Coloration:  Skin is not pale.      Findings: No erythema or rash.      Comments: Axilla, packing  coming out from what appears to be a 2 cm wound/incision from previous abscess, surrounding skin without signs of cellulitis no active drainage    After packing was removed small amount of bleeding, well appearing tissue on each side of the wound which is open   Neurological:      Mental Status: She is alert.      Motor: No abnormal muscle tone.      Coordination: Coordination normal.         Vital Signs  ED Triage Vitals [01/30/24 0928]   Temperature Pulse Respirations Blood Pressure SpO2   98.1 °F (36.7 °C) 87 20 115/74 99 %      Temp Source Heart Rate Source Patient Position - Orthostatic VS BP Location FiO2 (%)   Oral Monitor -- Right arm --      Pain Score       --           Vitals:    01/30/24 0928   BP: 115/74   Pulse: 87         Visual Acuity      ED Medications  Medications - No data to display    Diagnostic Studies  Results Reviewed       None                   No orders to display              Procedures  Procedures         ED Course                                             Medical Decision Making        Wound recheck and packing removal of right axillary abscess, packing was removed by grabbing the and then pulling it.,  Packing was removed in its entirety.  Wound was locally explored, small amount of bleeding wound still open advised patient to wash daily, warm soaks in a bath, continue taking medications as prescribed, follow-up for any concerns             Disposition  Final diagnoses:   Abscess packing removal   Visit for wound check     Time reflects when diagnosis was documented in both MDM as applicable and the Disposition within this note       Time User Action Codes Description Comment    1/30/2024 11:32 AM Karlo Mckenna [Z48.00] Abscess packing removal     1/30/2024 11:33 AM Karlo Mckenna [Z51.89] Visit for wound check           ED Disposition       ED Disposition   Discharge    Condition    Stable    Date/Time   Tue Jan 30, 2024 11:32 AM    Comment   Sandy Mendez discharge to home/self care.                   Follow-up Information    None         Discharge Medication List as of 1/30/2024 11:33 AM        CONTINUE these medications which have NOT CHANGED    Details   Cholecalciferol 2000 UNITS CAPS Take by mouth, Historical Med      doxycycline hyclate (VIBRAMYCIN) 100 mg capsule Take 1 capsule (100 mg total) by mouth 2 (two) times a day for 7 days, Starting Mon 1/29/2024, Until Mon 2/5/2024, Normal      doxylamine (UNISOM) 25 MG tablet Take 1 tablet (25 mg total) by mouth daily at bedtime as needed for sleep, Starting Wed 4/19/2023, Normal      ferrous sulfate 324 (65 Fe) mg Take 1 tablet (324 mg total) by mouth daily before breakfast, Starting Thu 11/2/2023, Normal      FLUoxetine (PROzac) 20 mg capsule Take 1 capsule (20 mg total) by mouth daily, Starting Wed 10/18/2023, Until Sat 10/12/2024, Normal      fluticasone (FLONASE) 50 mcg/act nasal spray 1 spray into each nostril daily, Starting Wed 3/22/2023, Normal      hydrocortisone (ANUSOL-HC) 2.5 % rectal cream Apply topically 2 (two) times a day, Starting Wed 3/22/2023, Normal      Magnesium 500 MG CAPS Take 500 mg by mouth, Historical Med      meloxicam (MOBIC) 15 mg tablet TAKE 1 TABLET BY MOUTH DAILY AS NEEDED FOR MODERATE PAIN., Starting Thu 12/28/2023, Normal      METHIMAZOLE PO Take 5 mg by mouth, Historical Med      omeprazole (PriLOSEC) 20 mg delayed release capsule Take 1 capsule (20 mg total) by mouth daily, Starting Wed 3/22/2023, Normal             No discharge procedures on file.    PDMP Review         Value Time User    PDMP Reviewed  Yes 1/28/2024 10:40 PM Larry Champagne MD            ED Provider  Electronically Signed by             Karlo Mckenna DO  01/30/24 9975

## 2024-02-01 LAB
BACTERIA WND AEROBE CULT: ABNORMAL
GRAM STN SPEC: ABNORMAL
GRAM STN SPEC: ABNORMAL

## 2024-02-11 ENCOUNTER — APPOINTMENT (EMERGENCY)
Dept: CT IMAGING | Facility: HOSPITAL | Age: 34
End: 2024-02-11
Payer: COMMERCIAL

## 2024-02-11 ENCOUNTER — HOSPITAL ENCOUNTER (OUTPATIENT)
Facility: HOSPITAL | Age: 34
Setting detail: OBSERVATION
Discharge: HOME | End: 2024-02-12
Attending: EMERGENCY MEDICINE | Admitting: INTERNAL MEDICINE
Payer: COMMERCIAL

## 2024-02-11 DIAGNOSIS — K64.4 EXTERNAL HEMORRHOIDS: ICD-10-CM

## 2024-02-11 DIAGNOSIS — L03.90 CELLULITIS: Primary | ICD-10-CM

## 2024-02-11 DIAGNOSIS — L03.111 CELLULITIS OF RIGHT AXILLA: ICD-10-CM

## 2024-02-11 DIAGNOSIS — R52 PAIN: ICD-10-CM

## 2024-02-11 PROBLEM — M79.89 SWELLING OF RIGHT HAND: Status: ACTIVE | Noted: 2024-02-11

## 2024-02-11 LAB
ALBUMIN SERPL BCP-MCNC: 4.3 G/DL (ref 3.5–5)
ALP SERPL-CCNC: 59 U/L (ref 34–104)
ALT SERPL W P-5'-P-CCNC: 15 U/L (ref 7–52)
ANION GAP SERPL CALCULATED.3IONS-SCNC: 8 MMOL/L
APTT PPP: 33 SECONDS (ref 23–37)
AST SERPL W P-5'-P-CCNC: 11 U/L (ref 13–39)
BASOPHILS # BLD AUTO: 0.05 THOUSANDS/ÂΜL (ref 0–0.1)
BASOPHILS NFR BLD AUTO: 1 % (ref 0–1)
BILIRUB SERPL-MCNC: 0.34 MG/DL (ref 0.2–1)
BUN SERPL-MCNC: 20 MG/DL (ref 5–25)
CALCIUM SERPL-MCNC: 9.1 MG/DL (ref 8.4–10.2)
CHLORIDE SERPL-SCNC: 104 MMOL/L (ref 96–108)
CK SERPL-CCNC: 50 U/L (ref 26–192)
CO2 SERPL-SCNC: 23 MMOL/L (ref 21–32)
CREAT SERPL-MCNC: 0.57 MG/DL (ref 0.6–1.3)
CRP SERPL QL: 2.6 MG/L
EOSINOPHIL # BLD AUTO: 0.61 THOUSAND/ÂΜL (ref 0–0.61)
EOSINOPHIL NFR BLD AUTO: 6 % (ref 0–6)
ERYTHROCYTE [DISTWIDTH] IN BLOOD BY AUTOMATED COUNT: 14.5 % (ref 11.6–15.1)
EXT PREGNANCY TEST URINE: NEGATIVE
EXT. CONTROL: NORMAL
GFR SERPL CREATININE-BSD FRML MDRD: 122 ML/MIN/1.73SQ M
GLUCOSE SERPL-MCNC: 90 MG/DL (ref 65–140)
HCT VFR BLD AUTO: 37.9 % (ref 34.8–46.1)
HGB BLD-MCNC: 12.2 G/DL (ref 11.5–15.4)
IMM GRANULOCYTES # BLD AUTO: 0.02 THOUSAND/UL (ref 0–0.2)
IMM GRANULOCYTES NFR BLD AUTO: 0 % (ref 0–2)
INR PPP: 1.05 (ref 0.84–1.19)
LACTATE SERPL-SCNC: 0.8 MMOL/L (ref 0.5–2)
LYMPHOCYTES # BLD AUTO: 1.73 THOUSANDS/ÂΜL (ref 0.6–4.47)
LYMPHOCYTES NFR BLD AUTO: 18 % (ref 14–44)
MCH RBC QN AUTO: 27.1 PG (ref 26.8–34.3)
MCHC RBC AUTO-ENTMCNC: 32.2 G/DL (ref 31.4–37.4)
MCV RBC AUTO: 84 FL (ref 82–98)
MONOCYTES # BLD AUTO: 0.4 THOUSAND/ÂΜL (ref 0.17–1.22)
MONOCYTES NFR BLD AUTO: 4 % (ref 4–12)
NEUTROPHILS # BLD AUTO: 6.68 THOUSANDS/ÂΜL (ref 1.85–7.62)
NEUTS SEG NFR BLD AUTO: 71 % (ref 43–75)
NRBC BLD AUTO-RTO: 0 /100 WBCS
PLATELET # BLD AUTO: 480 THOUSANDS/UL (ref 149–390)
PMV BLD AUTO: 8.9 FL (ref 8.9–12.7)
POTASSIUM SERPL-SCNC: 3.6 MMOL/L (ref 3.5–5.3)
PROCALCITONIN SERPL-MCNC: <0.05 NG/ML
PROT SERPL-MCNC: 7.8 G/DL (ref 6.4–8.4)
PROTHROMBIN TIME: 14.4 SECONDS (ref 11.6–14.5)
RBC # BLD AUTO: 4.5 MILLION/UL (ref 3.81–5.12)
SODIUM SERPL-SCNC: 135 MMOL/L (ref 135–147)
WBC # BLD AUTO: 9.49 THOUSAND/UL (ref 4.31–10.16)

## 2024-02-11 PROCEDURE — 85610 PROTHROMBIN TIME: CPT

## 2024-02-11 PROCEDURE — G1004 CDSM NDSC: HCPCS

## 2024-02-11 PROCEDURE — 80053 COMPREHEN METABOLIC PANEL: CPT

## 2024-02-11 PROCEDURE — 85730 THROMBOPLASTIN TIME PARTIAL: CPT

## 2024-02-11 PROCEDURE — 82550 ASSAY OF CK (CPK): CPT

## 2024-02-11 PROCEDURE — 85379 FIBRIN DEGRADATION QUANT: CPT | Performed by: HOSPITALIST

## 2024-02-11 PROCEDURE — 81025 URINE PREGNANCY TEST: CPT

## 2024-02-11 PROCEDURE — 87040 BLOOD CULTURE FOR BACTERIA: CPT

## 2024-02-11 PROCEDURE — 96374 THER/PROPH/DIAG INJ IV PUSH: CPT

## 2024-02-11 PROCEDURE — 36415 COLL VENOUS BLD VENIPUNCTURE: CPT

## 2024-02-11 PROCEDURE — 86140 C-REACTIVE PROTEIN: CPT | Performed by: EMERGENCY MEDICINE

## 2024-02-11 PROCEDURE — 85025 COMPLETE CBC W/AUTO DIFF WBC: CPT

## 2024-02-11 PROCEDURE — 99222 1ST HOSP IP/OBS MODERATE 55: CPT | Performed by: INTERNAL MEDICINE

## 2024-02-11 PROCEDURE — 83605 ASSAY OF LACTIC ACID: CPT

## 2024-02-11 PROCEDURE — 84145 PROCALCITONIN (PCT): CPT | Performed by: NURSE PRACTITIONER

## 2024-02-11 PROCEDURE — 73201 CT UPPER EXTREMITY W/DYE: CPT

## 2024-02-11 PROCEDURE — 99284 EMERGENCY DEPT VISIT MOD MDM: CPT

## 2024-02-11 PROCEDURE — 99285 EMERGENCY DEPT VISIT HI MDM: CPT

## 2024-02-11 RX ORDER — ONDANSETRON 2 MG/ML
4 INJECTION INTRAMUSCULAR; INTRAVENOUS EVERY 6 HOURS PRN
Status: DISCONTINUED | OUTPATIENT
Start: 2024-02-11 | End: 2024-02-12 | Stop reason: HOSPADM

## 2024-02-11 RX ORDER — DIPHENHYDRAMINE HCL 25 MG
25 TABLET ORAL ONCE
Status: COMPLETED | OUTPATIENT
Start: 2024-02-11 | End: 2024-02-11

## 2024-02-11 RX ORDER — VANCOMYCIN HYDROCHLORIDE 1 G/200ML
15 INJECTION, SOLUTION INTRAVENOUS EVERY 12 HOURS
Status: DISCONTINUED | OUTPATIENT
Start: 2024-02-11 | End: 2024-02-11

## 2024-02-11 RX ORDER — KETOROLAC TROMETHAMINE 30 MG/ML
15 INJECTION, SOLUTION INTRAMUSCULAR; INTRAVENOUS EVERY 6 HOURS PRN
Status: DISCONTINUED | OUTPATIENT
Start: 2024-02-11 | End: 2024-02-12

## 2024-02-11 RX ORDER — CEPHALEXIN 250 MG/1
500 CAPSULE ORAL ONCE
Status: COMPLETED | OUTPATIENT
Start: 2024-02-11 | End: 2024-02-11

## 2024-02-11 RX ORDER — HYDROXYZINE HYDROCHLORIDE 25 MG/1
25 TABLET, FILM COATED ORAL EVERY 6 HOURS PRN
Status: DISCONTINUED | OUTPATIENT
Start: 2024-02-11 | End: 2024-02-12 | Stop reason: HOSPADM

## 2024-02-11 RX ORDER — GUAIFENESIN/DEXTROMETHORPHAN 100-10MG/5
10 SYRUP ORAL EVERY 4 HOURS PRN
Status: DISCONTINUED | OUTPATIENT
Start: 2024-02-11 | End: 2024-02-12 | Stop reason: HOSPADM

## 2024-02-11 RX ORDER — ACETAMINOPHEN 325 MG/1
650 TABLET ORAL EVERY 6 HOURS PRN
Status: DISCONTINUED | OUTPATIENT
Start: 2024-02-11 | End: 2024-02-12 | Stop reason: HOSPADM

## 2024-02-11 RX ORDER — CEFAZOLIN SODIUM 1 G/50ML
1000 SOLUTION INTRAVENOUS EVERY 8 HOURS
Status: DISCONTINUED | OUTPATIENT
Start: 2024-02-11 | End: 2024-02-12 | Stop reason: HOSPADM

## 2024-02-11 RX ORDER — VANCOMYCIN HYDROCHLORIDE 1 G/200ML
15 INJECTION, SOLUTION INTRAVENOUS ONCE
Status: COMPLETED | OUTPATIENT
Start: 2024-02-11 | End: 2024-02-11

## 2024-02-11 RX ADMIN — KETOROLAC TROMETHAMINE 15 MG: 30 INJECTION, SOLUTION INTRAMUSCULAR; INTRAVENOUS at 09:20

## 2024-02-11 RX ADMIN — SODIUM CHLORIDE 1000 ML: 0.9 INJECTION, SOLUTION INTRAVENOUS at 06:22

## 2024-02-11 RX ADMIN — GUAIFENESIN AND DEXTROMETHORPHAN 10 ML: 100; 10 SYRUP ORAL at 17:30

## 2024-02-11 RX ADMIN — DIPHENHYDRAMINE HYDROCHLORIDE 25 MG: 25 TABLET ORAL at 06:22

## 2024-02-11 RX ADMIN — HYDROXYZINE HYDROCHLORIDE 25 MG: 25 TABLET, FILM COATED ORAL at 21:14

## 2024-02-11 RX ADMIN — IOHEXOL 85 ML: 350 INJECTION, SOLUTION INTRAVENOUS at 05:17

## 2024-02-11 RX ADMIN — VANCOMYCIN HYDROCHLORIDE 1000 MG: 1 INJECTION, SOLUTION INTRAVENOUS at 06:39

## 2024-02-11 RX ADMIN — CEFAZOLIN SODIUM 1000 MG: 1 SOLUTION INTRAVENOUS at 17:23

## 2024-02-11 RX ADMIN — CEFAZOLIN SODIUM 1000 MG: 1 SOLUTION INTRAVENOUS at 10:22

## 2024-02-11 RX ADMIN — CEPHALEXIN 500 MG: 250 CAPSULE ORAL at 04:30

## 2024-02-11 NOTE — ED PROVIDER NOTES
History  Chief Complaint   Patient presents with    Post-op Problem     Patient comes in reporting post op pain/ possible infection. First noticed cyst under R arm on the 1/20th. States on 1/29 she came in and had cyst drained/flushed/packed. Had packing removed 2 days later.  Reports she took full course of abx. States she developed redness/ swelling and itching. Area hot to the touch. Denies drainage. Swelling beginning to spread down R arm and into breast tissue (swelling noted on R hand and breast)      Sandy Mendez is a 33 y.o. female with a PMH of anxiety, GERD, hypothyroidism not on medication and depression presenting to the ED on February 11, 2024 with postop problem. Patient endorses that she noticed an abscess formation in her right axilla on 1/22.  She presented to the emergency department on 1/28 where the abscess was drained and packed and patient was started on doxycycline and referred to general surgery.  Patient returned 2 days later to have the packing removed.  Patient finished the doxycycline however she did not follow-up with general surgery.  Patient also admits to shaving over her armpit as it became very itchy and she thought that the hair was the reason that she was itching.  Patient also states that she has been having chills and nausea at home however she denies fevers or vomiting.  Patient is now concerned as the site is looking more erythematous, is more swollen, her right side feels warm and is now noting swelling into her right hand since finishing the doxycycline. She is having difficulty lowering her right arm due to discomfort to the area. Patient denies any other symptoms at this time.            Prior to Admission Medications   Prescriptions Last Dose Informant Patient Reported? Taking?   Cholecalciferol 2000 UNITS CAPS  Self Yes No   Sig: Take by mouth   FLUoxetine (PROzac) 20 mg capsule  Self No No   Sig: Take 1 capsule (20 mg total) by mouth daily   METHIMAZOLE PO  Self Yes  No   Sig: Take 5 mg by mouth   Patient not taking: Reported on 2023   Magnesium 500 MG CAPS  Self Yes No   Sig: Take 500 mg by mouth   doxylamine (UNISOM) 25 MG tablet  Self No No   Sig: Take 1 tablet (25 mg total) by mouth daily at bedtime as needed for sleep   Patient not taking: Reported on 2023   ferrous sulfate 324 (65 Fe) mg  Self No No   Sig: Take 1 tablet (324 mg total) by mouth daily before breakfast   fluticasone (FLONASE) 50 mcg/act nasal spray  Self No No   Si spray into each nostril daily   hydrocortisone (ANUSOL-HC) 2.5 % rectal cream  Self No No   Sig: Apply topically 2 (two) times a day   Patient taking differently: Apply topically if needed   meloxicam (MOBIC) 15 mg tablet   No No   Sig: TAKE 1 TABLET BY MOUTH DAILY AS NEEDED FOR MODERATE PAIN.   omeprazole (PriLOSEC) 20 mg delayed release capsule  Self No No   Sig: Take 1 capsule (20 mg total) by mouth daily      Facility-Administered Medications: None       Past Medical History:   Diagnosis Date    Abnormal TSH 06/10/2015    Disease of thyroid gland        History reviewed. No pertinent surgical history.    Family History   Problem Relation Age of Onset    Hyperlipidemia Mother     Hypertension Mother     Hyperlipidemia Father     Hypertension Father     Heart disease Maternal Grandmother     Diabetes Maternal Grandmother     Stomach cancer Maternal Grandmother     Heart disease Maternal Grandfather     Diabetes Maternal Grandfather     Heart disease Paternal Grandmother     Diabetes Paternal Grandmother     Heart disease Paternal Grandfather     Diabetes Paternal Grandfather      I have reviewed and agree with the history as documented.    E-Cigarette/Vaping    E-Cigarette Use Never User      E-Cigarette/Vaping Substances    Nicotine No     THC No     CBD No     Flavoring No      Social History     Tobacco Use    Smoking status: Never     Passive exposure: Past (occassionally, not daily)    Smokeless tobacco: Never   Vaping Use     Vaping status: Never Used   Substance Use Topics    Alcohol use: Yes     Comment: socially    Drug use: Never       Review of Systems   Constitutional:  Positive for chills. Negative for fever.   HENT:  Negative for ear pain and sore throat.    Eyes:  Negative for pain and visual disturbance.   Respiratory:  Negative for cough and shortness of breath.    Cardiovascular:  Negative for chest pain and palpitations.   Gastrointestinal:  Positive for nausea. Negative for abdominal pain, diarrhea and vomiting.   Genitourinary:  Negative for dysuria and hematuria.   Musculoskeletal:  Positive for myalgias. Negative for neck pain and neck stiffness.   Skin:  Positive for color change. Negative for rash.   Neurological:  Negative for seizures and syncope.   Psychiatric/Behavioral:  The patient is nervous/anxious.    All other systems reviewed and are negative.      Physical Exam  Physical Exam  Vitals and nursing note reviewed.   Constitutional:       General: She is not in acute distress.     Appearance: Normal appearance. She is normal weight. She is not ill-appearing, toxic-appearing or diaphoretic.   HENT:      Head: Normocephalic and atraumatic.      Right Ear: External ear normal.      Left Ear: External ear normal.      Nose: Nose normal. No congestion or rhinorrhea.      Mouth/Throat:      Mouth: Mucous membranes are moist.   Eyes:      General: No scleral icterus.        Right eye: No discharge.         Left eye: No discharge.      Extraocular Movements: Extraocular movements intact.      Conjunctiva/sclera: Conjunctivae normal.   Cardiovascular:      Rate and Rhythm: Normal rate and regular rhythm.      Heart sounds: Normal heart sounds. No murmur heard.     No friction rub. No gallop.   Pulmonary:      Effort: Pulmonary effort is normal. No respiratory distress.      Breath sounds: Normal breath sounds. No wheezing, rhonchi or rales.   Abdominal:      General: Abdomen is flat.   Musculoskeletal:         General:  Swelling and tenderness present. No signs of injury. Normal range of motion.      Cervical back: Normal range of motion and neck supple. No rigidity.      Comments: Swelling to her right hand and right wrist.   Skin:     General: Skin is warm.      Capillary Refill: Capillary refill takes less than 2 seconds.      Coloration: Skin is not pale.      Findings: Erythema present.      Comments: Erythema and warm to her right axilla. Warmth extends to her right chest wall and into her right hand.   Neurological:      General: No focal deficit present.      Mental Status: She is alert and oriented to person, place, and time. Mental status is at baseline.      Motor: No weakness.      Gait: Gait normal.   Psychiatric:         Mood and Affect: Mood normal.         Behavior: Behavior normal.         Vital Signs  ED Triage Vitals [02/11/24 0347]   Temperature Pulse Respirations Blood Pressure SpO2   98.3 °F (36.8 °C) 86 20 120/57 99 %      Temp Source Heart Rate Source Patient Position - Orthostatic VS BP Location FiO2 (%)   Oral Monitor Sitting Right arm --      Pain Score       6           Vitals:    02/11/24 0347 02/11/24 0625   BP: 120/57 114/55   Pulse: 86 91   Patient Position - Orthostatic VS: Sitting Sitting         Visual Acuity      ED Medications  Medications   vancomycin (VANCOCIN) IVPB (premix in dextrose) 1,000 mg 200 mL (1,000 mg Intravenous New Bag 2/11/24 0639)   sodium chloride 0.9 % bolus 1,000 mL (1,000 mL Intravenous New Bag 2/11/24 0622)   cephalexin (KEFLEX) capsule 500 mg (500 mg Oral Given 2/11/24 0430)   iohexol (OMNIPAQUE) 350 MG/ML injection (MULTI-DOSE) 85 mL (85 mL Intravenous Given 2/11/24 0517)   diphenhydrAMINE (BENADRYL) tablet 25 mg (25 mg Oral Given 2/11/24 0622)       Diagnostic Studies  Results Reviewed       Procedure Component Value Units Date/Time    POCT pregnancy, urine [[id number]]  (Normal) Resulted: 02/11/24 0515    Lab Status: Final result Updated: 02/11/24 0515     EXT Preg Test,  Ur Negative     Control Valid    Comprehensive metabolic panel [[id number]]  (Abnormal) Collected: 02/11/24 0440    Lab Status: Final result Specimen: Blood from Arm, Left Updated: 02/11/24 0511     Sodium 135 mmol/L      Potassium 3.6 mmol/L      Chloride 104 mmol/L      CO2 23 mmol/L      ANION GAP 8 mmol/L      BUN 20 mg/dL      Creatinine 0.57 mg/dL      Glucose 90 mg/dL      Calcium 9.1 mg/dL      AST 11 U/L      ALT 15 U/L      Alkaline Phosphatase 59 U/L      Total Protein 7.8 g/dL      Albumin 4.3 g/dL      Total Bilirubin 0.34 mg/dL      eGFR 122 ml/min/1.73sq m     Narrative:      National Kid[employer] Foundation guidelines for Chronic Kidney Disease (CKD):     Stage 1 with normal or high GFR (GFR > 90 mL/min/1.73 square meters)    Stage 2 Mild CKD (GFR = 60-89 mL/min/1.73 square meters)    Stage 3A Moderate CKD (GFR = 45-59 mL/min/1.73 square meters)    Stage 3B Moderate CKD (GFR = 30-44 mL/min/1.73 square meters)    Stage 4 Severe CKD (GFR = 15-29 mL/min/1.73 square meters)    Stage 5 End Stage CKD (GFR <15 mL/min/1.73 square meters)  Note: GFR calculation is accurate only with a steady state creatinine    CK [[id number]]  (Normal) Collected: 02/11/24 0440    Lab Status: Final result Specimen: Blood from Arm, Left Updated: 02/11/24 0510     Total CK 50 U/L     Lactic acid [[id number]]  (Normal) Collected: 02/11/24 0440    Lab Status: Final result Specimen: Blood from Arm, Left Updated: 02/11/24 0509     LACTIC ACID 0.8 mmol/L     Narrative:      Result may be elevated if tourniquet was used during collection.    C-reactive protein [[id number]]  (Normal) Collected: 02/11/24 0440    Lab Status: Final result Specimen: Blood from Arm, Left Updated: 02/11/24 0509     CRP 2.6 mg/L     Protime-INR [[id number]]  (Normal) Collected: 02/11/24 0440    Lab Status: Final result Specimen: Blood from Arm, Left Updated: 02/11/24 0506     Protime 14.4 seconds      INR 1.05    APTT [[id number]]  (Normal) Collected: 02/11/24  0440    Lab Status: Final result Specimen: Blood from Arm, Left Updated: 02/11/24 0506     PTT 33 seconds     CBC and differential [[id number]]  (Abnormal) Collected: 02/11/24 0440    Lab Status: Final result Specimen: Blood from Arm, Left Updated: 02/11/24 0456     WBC 9.49 Thousand/uL      RBC 4.50 Million/uL      Hemoglobin 12.2 g/dL      Hematocrit 37.9 %      MCV 84 fL      MCH 27.1 pg      MCHC 32.2 g/dL      RDW 14.5 %      MPV 8.9 fL      Platelets 480 Thousands/uL      nRBC 0 /100 WBCs      Neutrophils Relative 71 %      Immat GRANS % 0 %      Lymphocytes Relative 18 %      Monocytes Relative 4 %      Eosinophils Relative 6 %      Basophils Relative 1 %      Neutrophils Absolute 6.68 Thousands/µL      Immature Grans Absolute 0.02 Thousand/uL      Lymphocytes Absolute 1.73 Thousands/µL      Monocytes Absolute 0.40 Thousand/µL      Eosinophils Absolute 0.61 Thousand/µL      Basophils Absolute 0.05 Thousands/µL     Blood culture #1 [[id number]] Collected: 02/11/24 0440    Lab Status: In process Specimen: Blood from Arm, Right Updated: 02/11/24 0447    Blood culture #2 [[id number]] Collected: 02/11/24 0440    Lab Status: In process Specimen: Blood from Arm, Left Updated: 02/11/24 0447                   CT upper extremity w contrast right   Final Result by Ofelia Chen MD (02/11 0600)      Expected findings status post recent I&D. No recurrent cyst.         Workstation performed: [id number]                    Procedures  Procedures         ED Course                               SBIRT 20yo+      Flowsheet Row Most Recent Value   Initial Alcohol Screen: US AUDIT-C     1. How often do you have a drink containing alcohol? 1 Filed at: 02/11/2024 0347   2. How many drinks containing alcohol do you have on a typical day you are drinking?  1 Filed at: 02/11/2024 0347   3a. Male UNDER 65: How often do you have five or more drinks on one occasion? 0 Filed at: 02/11/2024 0347   3b. FEMALE Any Age, or MALE 65+: How often do  "you have 4 or more drinks on one occassion? 0 Filed at: 02/11/2024 0347   Audit-C Score 2 Filed at: 02/11/2024 0347   JOAN: How many times in the past year have you...    Used an illegal drug or used a prescription medication for non-medical reasons? Never Filed at: 02/11/2024 0347                      Medical Decision Making  Patient seen and examined noted to have cellulitis. Patient was previously treated with doxycycline for her axillary abscess and states that while she was on the antibiotics her symptoms were kept at bay, however since finishing them she feels that the pain/redness/warmth has increased. Patient did not follow up with general surgery and admits to shaving her axilla again. She is unable to lower her right arm due to discomfort and notes that the fingers on her right hand are more swollen. She is also endorsing chills and nausea at home. Due to failing outpatient PO antibiotics starting pt on IV vanc here and admitting her for further IV antibiotics.      Differential diagnosis includes but is not limited to cellulitis, osteomyelitis, lymphangitis, folliculitis, carbuncle, abscess, rhabdomyolysis, necrotizing fasciitis, allergic reaction, angioedema, DVT.      Patient's labs notable for: CMP with normal limits, CK 58, CRP of 2.6, lactic of 0.8, PTT of 33, PT of 14.4, INR of 1.05, CBC within normal limits    Imaging revealed:   No new abscess formation on CT    Discussed patient's case with SLIM regarding admission who accepted the patient for further evaluation and management.    All labs reviewed and utilized in the medical decision making process (if labs were ordered). Portions of the record may have been created with voice recognition software.  Occasional wrong word or \"sound a like\" substitutions may have occurred due to the inherent limitations of voice recognition software.  Read the chart carefully and recognize, using context, where substitutions have occurred.      Amount and/or " Complexity of Data Reviewed  Labs: ordered.  Radiology: ordered.    Risk  OTC drugs.  Prescription drug management.             Disposition  Final diagnoses:   Cellulitis     Time reflects when diagnosis was documented in both MDM as applicable and the Disposition within this note       Time User Action Codes Description Comment    2/11/2024  6:44 AM Georgiana Schaeffer Add [L03.90] Cellulitis           ED Disposition       ED Disposition   Admit    Condition   Stable    Date/Time   Sun Feb 11, 2024 0644    Comment   Case was discussed with Sivan Lira ([facility]) and the patient's admission status was agreed to be Admission Status: observation status to the service of Dr. Verde.               Follow-up Information    None         Patient's Medications   Discharge Prescriptions    No medications on file       No discharge procedures on file.    PDMP Review         Value Time User    PDMP Reviewed  Yes 2/11/2024  3:24 AM Larry Champagne MD            ED Provider  Electronically Signed by             Georgiana Schaeffer PA-C  02/11/24 0701

## 2024-02-11 NOTE — ASSESSMENT & PLAN NOTE
"Presentation: Patient originally presented to the ED on 01/28/2024 due to right axilla abscess secondary to ingrown hair at which time she underwent an I&D in the ED and was discharged home on Doxycycline. Patient was to follow up with general surgery which she did not. Patient reports that she completed the Doxycycline on 02/05/2023. Patient returns to the ED due to increased tenderness and swelling at the site with radiation to her right breast as well as associated nausea and chills.   Wound culture/gram stain (01/29/2024) grew Cutibacterium avidum.   CT right upper extremity: \"Expected findings status post recent I&D. No recurrent cyst.\"  Patient did not meet two SIRS criteria on admission.  Received PO Keflex and IV Vancomycin in the ED. Continue IV Ancef.  Pain control.  IV antiemetics.  "

## 2024-02-11 NOTE — LETTER
[facility] SURGICAL  [address] [address]  Dept: [phone]    February 12, 2024     Patient: Sandy Mendez   YOB: 1990   Date of Visit: 2/11/2024       To Whom it May Concern:    Sandy Mendez is under my professional care. She was seen in the hospital from 2/11/2024 to 02/12/24. She may return to work on February 14, 2024 without limitations.    If you have any questions or concerns, please don't hesitate to call.         Sincerely,          Destiny Otoole MD

## 2024-02-11 NOTE — LETTER
[facility] SURGICAL  [address] [address]  Dept: [phone]    February 12, 2024     Patient: Sandy Mendez   YOB: 1990   Date of Visit: 2/11/2024       To Whom it May Concern:    Sandy Mendez is under my professional care. She was seen in the hospital from 2/11/2024 to 02/12/24. She {Return to school/sport/work:[id number]}.    If you have any questions or concerns, please don't hesitate to call.         Sincerely,          Destiny Otoole MD

## 2024-02-11 NOTE — ASSESSMENT & PLAN NOTE
Patient reports history of right hand swelling.  No redness, wound or abrasion appreciated.  Suspect current exacerbation of right hand swelling 2/2 positioning related to above issue of right axilla.  Elevate extremity.

## 2024-02-11 NOTE — PROGRESS NOTES
Sandy Mendez is a 33 y.o. female who is currently ordered Vancomycin IV with management by the Pharmacy Consult service.  Relevant clinical data and objective / subjective history reviewed.  Vancomycin Assessment:  Indication and Goal AUC/Trough: Soft tissue (goal -600, trough >10)  Clinical Status:  new  Micro:     Renal Function:  SCr: 0.57 mg/dL  CrCl: 143.9 mL/min  Renal replacement: Not on dialysis  Days of Therapy: 1  Current Dose:  1000 mg IV once  Vancomycin Plan:  New Dosin mg IV every 12 hours  Estimated AUC: 462 mcg*hr/mL  Estimated Trough: 13 mcg/mL  Next Level:  at 0600  Renal Function Monitoring: Daily BMP and UOP  Pharmacy will continue to follow closely for s/sx of nephrotoxicity, infusion reactions and appropriateness of therapy.  BMP and CBC will be ordered per protocol. We will continue to follow the patient’s culture results and clinical progress daily.    Licha Denise, Pharmacist

## 2024-02-11 NOTE — ASSESSMENT & PLAN NOTE
Per chart review, patient formerly on Prozac; however, patient reports no current daily prescription medication use.

## 2024-02-11 NOTE — H&P
"[facility]  H&P  Name: Sandy Mendez 33 y.o. female I MRN: [id number]  Unit/Bed#: W -01 I Date of Admission: 2/11/2024   Date of Service: 2/11/2024 I Hospital Day: 0      Assessment/Plan   * Cellulitis of right axilla  Assessment & Plan  Presentation: Patient originally presented to the ED on 01/28/2024 due to right axilla abscess secondary to ingrown hair at which time she underwent an I&D in the ED and was discharged home on Doxycycline. Patient was to follow up with general surgery which she did not. Patient reports that she completed the Doxycycline on 02/05/2023. Patient returns to the ED due to increased tenderness and swelling at the site with radiation to her right breast as well as associated nausea and chills.   Wound culture/gram stain (01/29/2024) grew Cutibacterium avidum.   CT right upper extremity: \"Expected findings status post recent I&D. No recurrent cyst.\"  Patient did not meet two SIRS criteria on admission.  Received PO Keflex and IV Vancomycin in the ED. Continue IV Ancef.  Pain control.  IV antiemetics.    Swelling of right hand  Assessment & Plan  Patient reports history of right hand swelling.  No redness, wound or abrasion appreciated.  Suspect current exacerbation of right hand swelling 2/2 positioning related to above issue of right axilla.  Elevate extremity.    Severe depression (HCC)  Assessment & Plan  Per chart review, patient formerly on Prozac; however, patient reports no current daily prescription medication use.           VTE Pharmacologic Prophylaxis: VTE Score: 2 Low Risk (Score 0-2) - Encourage Ambulation.  Code Status: FULL Code  Discussion with family: Patient declined call to .     Anticipated Length of Stay: Patient will be admitted on an observation basis with an anticipated length of stay of less than 2 midnights secondary to right axilla cellulitis.    Total Time Spent on Date of Encounter in care of patient: 70 mins. This " time was spent on one or more of the following: performing physical exam; counseling and coordination of care; obtaining or reviewing history; documenting in the medical record; reviewing/ordering tests, medications or procedures; communicating with other healthcare professionals and discussing with patient's family/caregivers.    Chief Complaint: Right axilla pain, swelling    History of Present Illness:  Sandy Mendez is a 33 y.o. female with a PMH of depression who originally presented to the ED on 01/28/2024 due to right axilla abscess secondary to ingrown hair at which time she underwent an I&D in the ED and was discharged home on Doxycycline. Patient was to follow up with general surgery which she did not. Patient reports that she completed the Doxycycline on 02/05/2023. Patient returns to the ED due to increased tenderness and swelling at the site with radiation to her right breast as well as associated nausea and chills. Patient complains of right hand swelling as well which she states has been an ongoing intermittent issue for quite some time.    Review of Systems:  Review of Systems   Constitutional:  Positive for chills. Negative for fever.   Respiratory:  Negative for shortness of breath.    Cardiovascular:  Negative for chest pain.   Gastrointestinal:  Positive for nausea. Negative for abdominal pain and vomiting.   Musculoskeletal:  Positive for arthralgias.   Skin:  Positive for color change.       Past Medical and Surgical History:   Past Medical History:   Diagnosis Date    Abnormal TSH 06/10/2015    Disease of thyroid gland        History reviewed. No pertinent surgical history.    Meds/Allergies:  Prior to Admission medications    Medication Sig Start Date End Date Taking? Authorizing Provider   Cholecalciferol 2000 UNITS CAPS Take by mouth    Historical Provider, MD   doxylamine (UNISOM) 25 MG tablet Take 1 tablet (25 mg total) by mouth daily at bedtime as needed for sleep  Patient not taking:  Reported on 11/30/2023 4/19/23   Medina Palm MD   ferrous sulfate 324 (65 Fe) mg Take 1 tablet (324 mg total) by mouth daily before breakfast 11/2/23   Medina Palm MD   FLUoxetine (PROzac) 20 mg capsule Take 1 capsule (20 mg total) by mouth daily 10/18/23 10/12/24  Medina Palm MD   fluticasone (FLONASE) 50 mcg/act nasal spray 1 spray into each nostril daily 3/22/23   Medina Palm MD   hydrocortisone (ANUSOL-HC) 2.5 % rectal cream Apply topically 2 (two) times a day  Patient taking differently: Apply topically if needed 3/22/23   Medina Palm MD   Magnesium 500 MG CAPS Take 500 mg by mouth    Historical Provider, MD   meloxicam (MOBIC) 15 mg tablet TAKE 1 TABLET BY MOUTH DAILY AS NEEDED FOR MODERATE PAIN. 12/28/23   Medina Palm MD   METHIMAZOLE PO Take 5 mg by mouth  Patient not taking: Reported on 11/30/2023    Historical Provider, MD   omeprazole (PriLOSEC) 20 mg delayed release capsule Take 1 capsule (20 mg total) by mouth daily 3/22/23   Medina Palm MD     I have reviewed home medications with patient personally.    Allergies:   Allergies   Allergen Reactions    Pollen Extract Other (See Comments)    Other Rash     Cats       Social History:  Marital Status:    Occupation: Unknown  Patient Pre-hospital Living Situation: Home  Patient Pre-hospital Level of Mobility: walks  Patient Pre-hospital Diet Restrictions: None  Substance Use History:   Social History     Substance and Sexual Activity   Alcohol Use Yes    Comment: socially     Social History     Tobacco Use   Smoking Status Never    Passive exposure: Past (occassionally, not daily)   Smokeless Tobacco Never     Social History     Substance and Sexual Activity   Drug Use Never       Family History:  Family History   Problem Relation Age of Onset    Hyperlipidemia Mother     Hypertension Mother     Hyperlipidemia Father     Hypertension Father     Heart disease Maternal  Grandmother     Diabetes Maternal Grandmother     Stomach cancer Maternal Grandmother     Heart disease Maternal Grandfather     Diabetes Maternal Grandfather     Heart disease Paternal Grandmother     Diabetes Paternal Grandmother     Heart disease Paternal Grandfather     Diabetes Paternal Grandfather        Physical Exam:     Vitals:   Blood Pressure: 109/56 (02/11/24 0730)  Pulse: 90 (02/11/24 0730)  Temperature: 98.3 °F (36.8 °C) (02/11/24 0347)  Temp Source: Oral (02/11/24 0347)  Respirations: 18 (02/11/24 0730)  SpO2: 99 % (02/11/24 0730)    Physical Exam  Vitals and nursing note reviewed.   Constitutional:       General: She is not in acute distress.     Appearance: She is not ill-appearing or diaphoretic.   HENT:      Head: Normocephalic.      Nose: Nose normal.      Mouth/Throat:      Pharynx: Oropharynx is clear.   Eyes:      General: No scleral icterus.     Conjunctiva/sclera: Conjunctivae normal.   Cardiovascular:      Rate and Rhythm: Normal rate and regular rhythm.      Pulses: Normal pulses.      Heart sounds: Normal heart sounds.   Pulmonary:      Effort: Pulmonary effort is normal. No respiratory distress.      Breath sounds: Normal breath sounds. No wheezing, rhonchi or rales.   Chest:      Chest wall: No tenderness.   Abdominal:      General: Bowel sounds are normal. There is no distension.      Tenderness: There is no abdominal tenderness.   Musculoskeletal:      Right hand: Swelling present. No deformity, lacerations or tenderness. Decreased range of motion. Normal pulse.      Cervical back: Normal range of motion.   Skin:     General: Skin is warm and dry.      Comments: Right axilla with redness, tenderness and healing I&D site   Neurological:      Mental Status: She is alert and oriented to person, place, and time.   Psychiatric:         Speech: Speech normal.          Additional Data:     Lab Results:  Results from last 7 days   Lab Units 02/11/24  0440   WBC Thousand/uL 9.49   HEMOGLOBIN  g/dL 12.2   HEMATOCRIT % 37.9   PLATELETS Thousands/uL 480*   NEUTROS PCT % 71   LYMPHS PCT % 18   MONOS PCT % 4   EOS PCT % 6     Results from last 7 days   Lab Units 02/11/24  0440   SODIUM mmol/L 135   POTASSIUM mmol/L 3.6   CHLORIDE mmol/L 104   CO2 mmol/L 23   BUN mg/dL 20   CREATININE mg/dL 0.57*   ANION GAP mmol/L 8   CALCIUM mg/dL 9.1   ALBUMIN g/dL 4.3   TOTAL BILIRUBIN mg/dL 0.34   ALK PHOS U/L 59   ALT U/L 15   AST U/L 11*   GLUCOSE RANDOM mg/dL 90     Results from last 7 days   Lab Units 02/11/24  0440   INR  1.05             Results from last 7 days   Lab Units 02/11/24  0440   LACTIC ACID mmol/L 0.8       Lines/Drains:  Invasive Devices       Peripheral Intravenous Line  Duration             Peripheral IV 02/11/24 Left Antecubital <1 day                        Imaging: Reviewed radiology reports from this admission including: CT right upper extremity  CT upper extremity w contrast right   Final Result by Ofelia Chen MD (02/11 0600)      Expected findings status post recent I&D. No recurrent cyst.         Workstation performed: [id number]             EKG and Other Studies Reviewed on Admission:   EKG: No EKG obtained.    ** Please Note: This note has been constructed using a voice recognition system. **

## 2024-02-11 NOTE — PLAN OF CARE
Problem: PAIN - ADULT  Goal: Verbalizes/displays adequate comfort level or baseline comfort level  Description: Interventions:  - Encourage patient to monitor pain and request assistance  - Assess pain using appropriate pain scale  - Administer analgesics based on type and severity of pain and evaluate response  - Implement non-pharmacological measures as appropriate and evaluate response  - Consider cultural and social influences on pain and pain management  - Notify physician/advanced practitioner if interventions unsuccessful or patient reports new pain  Outcome: Progressing     Problem: SAFETY ADULT  Goal: Patient will remain free of falls  Description: INTERVENTIONS:  - Educate patient/family on patient safety including physical limitations  - Instruct patient to call for assistance with activity   - Consult OT/PT to assist with strengthening/mobility   - Keep Call bell within reach  - Keep bed low and locked with side rails adjusted as appropriate  - Keep care items and personal belongings within reach  - Initiate and maintain comfort rounds  - Make Fall Risk Sign visible to staff  - Offer Toileting every 2 Hours, in advance of need  - Initiate/Maintain bed alarm  - Obtain necessary fall risk management equipment:   - Apply yellow socks and bracelet for high fall risk patients  - Consider moving patient to room near nurses station  Outcome: Progressing  Goal: Maintain or return to baseline ADL function  Description: INTERVENTIONS:  -  Assess patient's ability to carry out ADLs; assess patient's baseline for ADL function and identify physical deficits which impact ability to perform ADLs (bathing, care of mouth/teeth, toileting, grooming, dressing, etc.)  - Assess/evaluate cause of self-care deficits   - Assess range of motion  - Assess patient's mobility; develop plan if impaired  - Assess patient's need for assistive devices and provide as appropriate  - Encourage maximum independence but intervene and  supervise when necessary  - Involve family in performance of ADLs  - Assess for home care needs following discharge   - Consider OT consult to assist with ADL evaluation and planning for discharge  - Provide patient education as appropriate  Outcome: Progressing  Goal: Maintains/Returns to pre admission functional level  Description: INTERVENTIONS:  - Perform AM-PAC 6 Click Basic Mobility/ Daily Activity assessment daily.  - Set and communicate daily mobility goal to care team and patient/family/caregiver.   - Collaborate with rehabilitation services on mobility goals if consulted  - Perform Range of Motion 2 times a day.  - Reposition patient every 2 hours.  - Dangle patient 2 times a day  - Stand patient 2 times a day  - Ambulate patient 2 times a day  - Out of bed to chair 2 times a day   - Out of bed for meals 2 times a day  - Out of bed for toileting  - Record patient progress and toleration of activity level   Outcome: Progressing

## 2024-02-12 ENCOUNTER — HOSPITAL ENCOUNTER (OUTPATIENT)
Dept: VASCULAR ULTRASOUND | Facility: HOSPITAL | Age: 34
Setting detail: OBSERVATION
Discharge: HOME | End: 2024-02-12
Payer: COMMERCIAL

## 2024-02-12 VITALS
HEIGHT: 66 IN | OXYGEN SATURATION: 98 % | BODY MASS INDEX: 26.52 KG/M2 | WEIGHT: 165 LBS | TEMPERATURE: 97.9 F | RESPIRATION RATE: 16 BRPM | DIASTOLIC BLOOD PRESSURE: 64 MMHG | SYSTOLIC BLOOD PRESSURE: 104 MMHG | HEART RATE: 73 BPM

## 2024-02-12 PROBLEM — D64.9 ANEMIA: Status: ACTIVE | Noted: 2024-02-12

## 2024-02-12 PROBLEM — M79.89 LEFT UPPER EXTREMITY SWELLING: Status: ACTIVE | Noted: 2024-02-12

## 2024-02-12 LAB
ANION GAP SERPL CALCULATED.3IONS-SCNC: 3 MMOL/L
BASOPHILS # BLD AUTO: 0.1 THOUSANDS/ÂΜL (ref 0–0.1)
BASOPHILS NFR BLD AUTO: 1 % (ref 0–1)
BUN SERPL-MCNC: 10 MG/DL (ref 5–25)
CALCIUM SERPL-MCNC: 8.2 MG/DL (ref 8.4–10.2)
CHLORIDE SERPL-SCNC: 111 MMOL/L (ref 96–108)
CO2 SERPL-SCNC: 22 MMOL/L (ref 21–32)
CREAT SERPL-MCNC: 0.52 MG/DL (ref 0.6–1.3)
D DIMER PPP FEU-MCNC: 1.52 UG/ML FEU
EOSINOPHIL # BLD AUTO: 0.76 THOUSAND/ÂΜL (ref 0–0.61)
EOSINOPHIL NFR BLD AUTO: 10 % (ref 0–6)
ERYTHROCYTE [DISTWIDTH] IN BLOOD BY AUTOMATED COUNT: 14.6 % (ref 11.6–15.1)
GFR SERPL CREATININE-BSD FRML MDRD: 125 ML/MIN/1.73SQ M
GLUCOSE SERPL-MCNC: 119 MG/DL (ref 65–140)
HCT VFR BLD AUTO: 33.2 % (ref 34.8–46.1)
HGB BLD-MCNC: 10.9 G/DL (ref 11.5–15.4)
IMM GRANULOCYTES # BLD AUTO: 0.02 THOUSAND/UL (ref 0–0.2)
IMM GRANULOCYTES NFR BLD AUTO: 0 % (ref 0–2)
LYMPHOCYTES # BLD AUTO: 3.24 THOUSANDS/ÂΜL (ref 0.6–4.47)
LYMPHOCYTES NFR BLD AUTO: 42 % (ref 14–44)
MCH RBC QN AUTO: 27.5 PG (ref 26.8–34.3)
MCHC RBC AUTO-ENTMCNC: 32.8 G/DL (ref 31.4–37.4)
MCV RBC AUTO: 84 FL (ref 82–98)
MONOCYTES # BLD AUTO: 0.45 THOUSAND/ÂΜL (ref 0.17–1.22)
MONOCYTES NFR BLD AUTO: 6 % (ref 4–12)
NEUTROPHILS # BLD AUTO: 3.2 THOUSANDS/ÂΜL (ref 1.85–7.62)
NEUTS SEG NFR BLD AUTO: 41 % (ref 43–75)
NRBC BLD AUTO-RTO: 0 /100 WBCS
PLATELET # BLD AUTO: 418 THOUSANDS/UL (ref 149–390)
PMV BLD AUTO: 9.1 FL (ref 8.9–12.7)
POTASSIUM SERPL-SCNC: 3.7 MMOL/L (ref 3.5–5.3)
PROCALCITONIN SERPL-MCNC: <0.05 NG/ML
RBC # BLD AUTO: 3.96 MILLION/UL (ref 3.81–5.12)
SODIUM SERPL-SCNC: 136 MMOL/L (ref 135–147)
WBC # BLD AUTO: 7.77 THOUSAND/UL (ref 4.31–10.16)

## 2024-02-12 PROCEDURE — 85025 COMPLETE CBC W/AUTO DIFF WBC: CPT | Performed by: INTERNAL MEDICINE

## 2024-02-12 PROCEDURE — 84145 PROCALCITONIN (PCT): CPT | Performed by: INTERNAL MEDICINE

## 2024-02-12 PROCEDURE — 93971 EXTREMITY STUDY: CPT

## 2024-02-12 PROCEDURE — 80048 BASIC METABOLIC PNL TOTAL CA: CPT | Performed by: INTERNAL MEDICINE

## 2024-02-12 PROCEDURE — 93971 EXTREMITY STUDY: CPT | Performed by: SURGERY

## 2024-02-12 PROCEDURE — 99239 HOSP IP/OBS DSCHRG MGMT >30: CPT | Performed by: INTERNAL MEDICINE

## 2024-02-12 RX ORDER — HYDROCORTISONE 25 MG/G
CREAM TOPICAL AS NEEDED
Start: 2024-02-12

## 2024-02-12 RX ORDER — ACETAMINOPHEN 325 MG/1
650 TABLET ORAL EVERY 6 HOURS PRN
Start: 2024-02-12

## 2024-02-12 RX ORDER — ENOXAPARIN SODIUM 100 MG/ML
1 INJECTION SUBCUTANEOUS EVERY 12 HOURS SCHEDULED
Status: DISCONTINUED | OUTPATIENT
Start: 2024-02-12 | End: 2024-02-12 | Stop reason: HOSPADM

## 2024-02-12 RX ORDER — CEPHALEXIN 500 MG/1
500 CAPSULE ORAL EVERY 6 HOURS SCHEDULED
Qty: 32 CAPSULE | Refills: 0 | Status: SHIPPED | OUTPATIENT
Start: 2024-02-13 | End: 2024-02-21

## 2024-02-12 RX ADMIN — CEFAZOLIN SODIUM 1000 MG: 1 SOLUTION INTRAVENOUS at 16:45

## 2024-02-12 RX ADMIN — CEFAZOLIN SODIUM 1000 MG: 1 SOLUTION INTRAVENOUS at 10:18

## 2024-02-12 RX ADMIN — CEFAZOLIN SODIUM 1000 MG: 1 SOLUTION INTRAVENOUS at 01:28

## 2024-02-12 RX ADMIN — ENOXAPARIN SODIUM 70 MG: 80 INJECTION SUBCUTANEOUS at 02:48

## 2024-02-12 NOTE — PROGRESS NOTES
Hospitalist cross coverage:    Notified by nursing overnight that patient had developed left hand swelling. On evaluation her left arm is swollen. D-dimer ordered and positive. Lovenox ordered and US ordered.

## 2024-02-12 NOTE — CASE MANAGEMENT
Case Management Progress Note    Patient name Sandy Mendez  Location W /W -01 MRN [id number]  : 1990 Date 2024       LOS (days): 0  Geometric Mean LOS (GMLOS) (days):   Days to GMLOS:        OBJECTIVE:        Current admission status: Observation  Preferred Pharmacy:   [facility]/pharmacy #[id number] - [place] [id number] [address] [address]  Phone: [phone] Fax: [phone]    Primary Care Provider: Medina Palm MD    Primary Insurance: [employer] PENDING  Secondary Insurance:     PROGRESS NOTE:    Cm met with patient to review Cm role. Patient does not anticipate having any needs upon discharge. Patient interested in resources to assist with utility bill assistance. CM printed and handed information to patient to call.

## 2024-02-12 NOTE — PLAN OF CARE
Problem: PAIN - ADULT  Goal: Verbalizes/displays adequate comfort level or baseline comfort level  Description: Interventions:  - Encourage patient to monitor pain and request assistance  - Assess pain using appropriate pain scale  - Administer analgesics based on type and severity of pain and evaluate response  - Implement non-pharmacological measures as appropriate and evaluate response  - Consider cultural and social influences on pain and pain management  - Notify physician/advanced practitioner if interventions unsuccessful or patient reports new pain  Outcome: Progressing     Problem: SAFETY ADULT  Goal: Patient will remain free of falls  Description: INTERVENTIONS:  - Educate patient/family on patient safety including physical limitations  - Instruct patient to call for assistance with activity   - Consult OT/PT to assist with strengthening/mobility   - Keep Call bell within reach  - Keep bed low and locked with side rails adjusted as appropriate  - Keep care items and personal belongings within reach  - Initiate and maintain comfort rounds  - Make Fall Risk Sign visible to staff  - Offer Toileting every  Hours, in advance of need  - Initiate/Maintain alarm  - Obtain necessary fall risk management equipment:   - Apply yellow socks and bracelet for high fall risk patients  - Consider moving patient to room near nurses station  Outcome: Progressing  Goal: Maintain or return to baseline ADL function  Description: INTERVENTIONS:  -  Assess patient's ability to carry out ADLs; assess patient's baseline for ADL function and identify physical deficits which impact ability to perform ADLs (bathing, care of mouth/teeth, toileting, grooming, dressing, etc.)  - Assess/evaluate cause of self-care deficits   - Assess range of motion  - Assess patient's mobility; develop plan if impaired  - Assess patient's need for assistive devices and provide as appropriate  - Encourage maximum independence but intervene and supervise  when necessary  - Involve family in performance of ADLs  - Assess for home care needs following discharge   - Consider OT consult to assist with ADL evaluation and planning for discharge  - Provide patient education as appropriate  Outcome: Progressing  Goal: Maintains/Returns to pre admission functional level  Description: INTERVENTIONS:  - Perform AM-PAC 6 Click Basic Mobility/ Daily Activity assessment daily.  - Set and communicate daily mobility goal to care team and patient/family/caregiver.   - Collaborate with rehabilitation services on mobility goals if consulted  - Perform Range of Motion  times a day.  - Reposition patient every  hours.  - Dangle patient  times a day  - Stand patient  times a day  - Ambulate patient  times a day  - Out of bed to chair  times a day   - Out of bed for meals  times a day  - Out of bed for toileting  - Record patient progress and toleration of activity level   Outcome: Progressing

## 2024-02-12 NOTE — CASE MANAGEMENT
Case Management Assessment & Discharge Planning Note    Patient name Sandy Mendez  Location W /W -01 MRN [id number]  : 1990 Date 2024       Current Admission Date: 2024  Current Admission Diagnosis:Cellulitis of right axilla   Patient Active Problem List    Diagnosis Date Noted    Anemia 2024    Cellulitis of right axilla 2024    Swelling of right hand 2024    Dyspepsia 2023    Severe depression (HCC) 2023    Sleep disturbance 2023      LOS (days): 0  Geometric Mean LOS (GMLOS) (days):   Days to GMLOS:     OBJECTIVE:              Current admission status: Observation       Preferred Pharmacy:   [facility]/pharmacy #[id number] - [place], [place] - [facility] [address] [address]  Phone: [phone] Fax: [phone]    Primary Care Provider: Medina Palm MD    Primary Insurance: [employer] PENDING  Secondary Insurance:     ASSESSMENT:  Active Health Care Proxies    There are no active Health Care Proxies on file.                 Readmission Root Cause  30 Day Readmission: No    Patient Information  Admitted from:: Home  Mental Status: Alert  During Assessment patient was accompanied by: Not accompanied during assessment  Assessment information provided by:: Patient  Primary Caregiver: Self  Support Systems: Self, Children, Parent  County of Residence: [place]  What city do you live in?: [place]  Home entry access options. Select all that apply.: Stairs  Number of steps to enter home.: One Flight  Do the steps have railings?: Yes  Type of Current Residence: 2 story home  Upon entering residence, is there a bedroom on the main floor (no further steps)?: No  A bedroom is located on the following floor levels of residence (select all that apply):: 2nd Floor  Upon entering residence, is there a bathroom on the main floor (no further steps)?: Yes  Number of steps to 2nd floor from main floor: One Flight  Living Arrangements:  Lives w/ Daughter  Is patient a ?: No    Activities of Daily Living Prior to Admission  Functional Status: Independent  Completes ADLs independently?: Yes  Ambulates independently?: Yes  Does patient use assisted devices?: No  Does patient currently own DME?: No  Does patient have a history of Outpatient Therapy (PT/OT)?: No  Does the patient have a history of Short-Term Rehab?: No  Does patient have a history of HHC?: No  Does patient currently have HHC?: No         Patient Information Continued  Income Source: Employed  Does patient have prescription coverage?: Yes  Does patient receive dialysis treatments?: No  Does patient have a history of substance abuse?: No  Does patient have a history of Mental Health Diagnosis?: No    PHQ 2/9 Screening   Reviewed PHQ 2/9 Depression Screening Score?: No    Means of Transportation  Means of Transport to Appts:: Drives Self      Social Determinants of Health (SDOH)      Flowsheet Row Most Recent Value   Housing Stability    In the last 12 months, was there a time when you were not able to pay the mortgage or rent on time? N   In the last 12 months, was there a time when you did not have a steady place to sleep or slept in a shelter (including now)? N   Transportation Needs    In the past 12 months, has lack of transportation kept you from medical appointments or from getting medications? no   In the past 12 months, has lack of transportation kept you from meetings, work, or from getting things needed for daily living? No   Food Insecurity    Within the past 12 months, you worried that your food would run out before you got the money to buy more. Never true   Within the past 12 months, the food you bought just didn't last and you didn't have money to get more. Never true   Utilities    In the past 12 months has the electric, gas, oil, or water company threatened to shut off services in your home? No  [Patient struggling with bill payments. Will provide resources to  patient.]            DISCHARGE DETAILS:    Discharge planning discussed with:: Patient  Freedom of Choice: Yes  Comments - Freedom of Choice: Cm met with patient to review CM role. Patient does not anticipate having any needs upon discharge.  CM contacted family/caregiver?: No- see comments  Were Treatment Team discharge recommendations reviewed with patient/caregiver?: Yes  Did patient/caregiver verbalize understanding of patient care needs?: Yes  Were patient/caregiver advised of the risks associated with not following Treatment Team discharge recommendations?: Yes

## 2024-02-12 NOTE — DISCHARGE INSTR - AVS FIRST PAGE
Dear Sandy Mendez,     It was our pleasure to care for you here at [facility].  It is our hope that we were always able to exceed the expected standards for your care during your stay.  You were hospitalized due to right axillary cellulitis.  You were cared for on the 4th floor under the service of Destiny Otoole MD with the [facility] who covers for your primary care physician (PCP), Medina Palm MD, while you were hospitalized.  If you have any questions or concerns related to this hospitalization, you may contact us at .  For follow up as well as medication refills, we recommend that you follow up with your primary care physician.  A registered nurse will reach out to you by phone within a few days after your discharge to answer any additional questions that you may have after going home.  However, at this time we provide for you here, the most important instructions / recommendations at discharge:     Notable Medication Adjustments -   Keflex 500 mg every 6 hours for 8 days to complete a total of 10 days antibiotic treatment.  Testing Required after Discharge -   None.  However I am recommending to your primary care physician to recheck/monitor your CBC with differential count and BMP on follow-up in 1 to 2 weeks.  ** Please contact your PCP to request testing orders for any of the testing recommended here **  Important follow up information -   Outpatient follow-up with primary care physician.  Other Instructions -   As discussed with you, may have warm compress and eventually cold compress on your left hand/upper extremity (where you have superficial thrombophlebitis likely from the IV).  Call your primary care physician and/or go to the emergency in the emergency room, if you develop any fever or chills, worsening swelling/redness or any developing of any drainage or pus or any development of diarrhea.  Hydrate  yourself well every day and eat nutritious foods.  Continue practicing good hygiene.  Please review this entire after visit summary as additional general instructions including medication list, appointments, activity, diet, any pertinent wound care, and other additional recommendations from your care team that may be provided for you.      Sincerely,     Destiny Otoole MD

## 2024-02-12 NOTE — PLAN OF CARE
Problem: PAIN - ADULT  Goal: Verbalizes/displays adequate comfort level or baseline comfort level  Description: Interventions:  - Encourage patient to monitor pain and request assistance  - Assess pain using appropriate pain scale  - Administer analgesics based on type and severity of pain and evaluate response  - Implement non-pharmacological measures as appropriate and evaluate response  - Consider cultural and social influences on pain and pain management  - Notify physician/advanced practitioner if interventions unsuccessful or patient reports new pain  Outcome: Completed     Problem: SAFETY ADULT  Goal: Patient will remain free of falls  Description: INTERVENTIONS:  - Educate patient/family on patient safety including physical limitations  - Instruct patient to call for assistance with activity   - Consult OT/PT to assist with strengthening/mobility   - Keep Call bell within reach  - Keep bed low and locked with side rails adjusted as appropriate  - Keep care items and personal belongings within reach  - Initiate and maintain comfort rounds  - Make Fall Risk Sign visible to staff  - Apply yellow socks and bracelet for high fall risk patients  - Consider moving patient to room near nurses station  Outcome: Completed  Goal: Maintain or return to baseline ADL function  Description: INTERVENTIONS:  -  Assess patient's ability to carry out ADLs; assess patient's baseline for ADL function and identify physical deficits which impact ability to perform ADLs (bathing, care of mouth/teeth, toileting, grooming, dressing, etc.)  - Assess/evaluate cause of self-care deficits   - Assess range of motion  - Assess patient's mobility; develop plan if impaired  - Assess patient's need for assistive devices and provide as appropriate  - Encourage maximum independence but intervene and supervise when necessary  - Involve family in performance of ADLs  - Assess for home care needs following discharge   - Consider OT consult to  assist with ADL evaluation and planning for discharge  - Provide patient education as appropriate  Outcome: Completed  Goal: Maintains/Returns to pre admission functional level  Description: INTERVENTIONS:  - Perform AM-PAC 6 Click Basic Mobility/ Daily Activity assessment daily.  - Set and communicate daily mobility goal to care team and patient/family/caregiver.   - Collaborate with rehabilitation services on mobility goals if consulted  - Out of bed for toileting  - Record patient progress and toleration of activity level   Outcome: Completed

## 2024-02-12 NOTE — DISCHARGE SUMMARY
"[facility]  Discharge- Sandy Mendez 1990, 33 y.o. female MRN: [id number]  Unit/Bed#: W -01 Encounter: [id number]  Primary Care Provider: Medina Palm MD   Date and time admitted to hospital: 2/11/2024  3:37 AM    * Cellulitis of right axilla  Assessment & Plan  Presentation: Patient originally presented to the ED on 01/28/2024 due to right axilla abscess secondary to ingrown hair at which time she underwent an I&D in the ED and was discharged home on Doxycycline. Patient was to follow up with general surgery which she did not. Patient reports that she completed the Doxycycline on 02/05/2023. Patient returns to the ED due to increased tenderness and swelling at the site with radiation to her right breast as well as associated nausea and chills.   Wound culture/gram stain (01/29/2024) grew Cutibacterium avidum.   CT right upper extremity: \"Expected findings status post recent I&D. No recurrent cyst.\"  Patient did not meet two SIRS criteria on admission.  Received PO Keflex and IV Vancomycin in the ED. Continue IV Ancef.  Pain control.  IV antiemetics.    Today, right axillary swelling and erythema, had subsided significantly.  On discharge, patient was prescribed with Keflex for 8 more days to complete 10 days of antibiotic treatment.  Outpatient follow-up with primary care physician.    Swelling of right hand  Assessment & Plan  Patient reported history of right hand swelling.  This had resolved today.  No redness, wound or abrasion appreciated.  Suspect current exacerbation of right hand swelling 2/2 positioning related to above issue of right axilla.  Elevate extremity.  Outpatient follow-up with primary care physician.  According to the patient, she has history of on and off swelling of her joints (different joints), and had been referred to rheumatologist by her primary care physician in the past.  Since patient told me that she still having this intermittent " swelling of her joints, I instructed the patient to follow-up with her primary care physician and I am recommending to patient's PCP to possibly further evaluate this and/or possibly refer the patient back to the rheumatologist.    Left upper extremity swelling  Assessment & Plan  Overnight, patient developed left upper extremity swelling, where she had previous IV and blood draws, which according to the patient had been subsiding today.  Duplex scan done revealed no DVTs, but was found to have acute nonocclusive superficial thrombophlebitis in the median cubital vein and basilic vein at the antecubital fossa.  Patient was instructed to have warm compresses first and eventually cold compress.  Outpatient follow-up with primary care physician.    Anemia  Assessment & Plan  Patient's hemoglobin went down today, from normal, but no active bleeding.  Possibly hemodilution from IV fluids.  According to the patient, she was prescribed with iron pills in the past, however she did not take this medication.  On review, based on CBCs in epic, she had anemia many years ago (2015/2016), however none since 2023.  Outpatient follow-up with primary care physician.  I am recommending rechecking/monitoring patient's CBC.  This was discussed with the patient.    Severe depression (HCC)  Assessment & Plan  Per chart review, patient formerly on Prozac; however, patient reports no current daily prescription medication use.  Outpatient follow-up with primary care physician.          Discharging Physician / Practitioner: Destiny Otoole MD  PCP: Medina Palm MD  Admission Date:   Admission Orders (From admission, onward)       Ordered        02/11/24 0645  Place in Observation  Once                          Discharge Date: 02/12/24    Consultations During Hospital Stay:  None.    Procedures Performed:   Please see diagnosis and notes above.    Significant Findings / Test Results:   Please see diagnosis and notes  above.    Incidental Findings:   None.    Test Results Pending at Discharge (will require follow up):   Succeeding and final results of the blood cultures.  So far negative.  Patient's primary care physician may follow-up these.     Outpatient Tests Requested:  None.  However I am recommending rechecking/monitoring patient's CBC with differential count as well as patient's BMP, most specially after antibiotic course is done.  Patient's primary care physician may facilitate these.    Complications: None.    Reason for Admission: Right axilla pain and swelling    Hospital Course:   Sandy Mendez is a 33 y.o. female patient who originally presented to the hospital on 2/11/2024 due to right axilla pain and swelling.  Patient recently had an abscess in her right axilla and underwent incision and drainage in the emergency room.  Patient was prescribed with doxycycline for a week.  On 2/11, patient came back to the emergency room, due to again having right axilla swelling and pains.  Patient was found to have right axillary cellulitis.  Patient was on IV antibiotic, initially got p.o. Keflex and IV vancomycin in the emergency room and was eventually placed on IV cefazolin.  Patient's condition improved.  Patient's right axillary pain and swelling and erythema had subsided.  Thus on discharge, patient was prescribed with oral Keflex for 8 more days to complete a 10-day antibiotic course.  Patient was okay with this plan.  Patient was instructed to call her primary care physician and/or go to the nearest emergency room, if she develops any fever or chills, having swelling again, or any diarrhea.    Please see above list of diagnoses and related plan for additional information.       Condition at Discharge: stable    Discharge Day Visit / Exam:   Subjective:    Patient was seen and examined this morning and I again visited the patient this afternoon prior to discharge.  On both occasions, patient was doing fine and better.   "Patient told me, that her right axillary swelling and pains had already subsided significantly.  No other pains.  No fever or chills.  No shortness of breath.  No nausea or vomiting.  Patient also told me that her right hand swelling had also subsided.  Overnight, patient noted left upper extremity swelling where she had previous IV and blood draws, which according to her, was also significantly subsiding at the time I saw her.  Patient was okay with her discharge to home today.      Vitals: Blood Pressure: 104/64 (02/12/24 1543)  Pulse: 73 (02/12/24 1543)  Temperature: 97.9 °F (36.6 °C) (02/12/24 1543)  Temp Source: Oral (02/12/24 1543)  Respirations: 16 (02/12/24 1543)  Height: 5' 6\" (167.6 cm) (02/11/24 0730)  Weight - Scale: 74.8 kg (165 lb) (02/11/24 0730)  SpO2: 98 % (02/12/24 1543)  Exam:   Physical Exam  Vitals and nursing note reviewed. Exam conducted with a chaperone present.   Constitutional:       General: She is not in acute distress.     Appearance: She is not ill-appearing, toxic-appearing or diaphoretic.   Cardiovascular:      Rate and Rhythm: Normal rate and regular rhythm.      Heart sounds: Normal heart sounds.   Pulmonary:      Effort: Pulmonary effort is normal. No respiratory distress.      Breath sounds: Normal breath sounds.   Abdominal:      General: Bowel sounds are normal. There is no distension.      Palpations: Abdomen is soft.      Tenderness: There is no abdominal tenderness.   Musculoskeletal:         General: No swelling or tenderness.      Right lower leg: No edema.      Left lower leg: No edema.      Comments: I did not appreciate any more right axillary swelling or tenderness, on examination.  No more swelling also noted on patient's bilateral upper extremities.   Skin:     Coloration: Skin is not pale.      Findings: No erythema or rash.      Comments: I did not appreciate any erythema or any abnormal warmth or drainage at the right axillary area, status post I&D recently. "   Neurological:      General: No focal deficit present.      Mental Status: She is alert and oriented to person, place, and time. Mental status is at baseline.   Psychiatric:         Mood and Affect: Mood normal.         Behavior: Behavior normal.         Thought Content: Thought content normal.            Discussion with Family: Updated  (significant other) at bedside.    Discharge instructions/Information to patient and family:   See after visit summary for information provided to patient and family.      Provisions for Follow-Up Care:  See after visit summary for information related to follow-up care and any pertinent home health orders.      Mobility at time of Discharge:   Basic Mobility Inpatient Raw Score: 24  JH-HLM Goal: 8: Walk 250 feet or more  JH-HLM Achieved: 3: Sit at edge of bed  HLM Goal achieved. Continue to encourage appropriate mobility.     Disposition:   Home    Planned Readmission: None.     Discharge Statement:  I spent 45 minutes discharging the patient. This time was spent on the day of discharge. I had direct contact with the patient on the day of discharge. Greater than 50% of the total time was spent examining patient, answering all patient questions, arranging and discussing plan of care with patient as well as directly providing post-discharge instructions.  Additional time then spent on discharge activities.    Discharge Medications:  See after visit summary for reconciled discharge medications provided to patient and/or family.      **Please Note: This note may have been constructed using a voice recognition system**

## 2024-02-12 NOTE — UTILIZATION REVIEW
Initial Clinical Review    Admission: Date/Time/Statement:   Admission Orders (From admission, onward)       Ordered        02/11/24 0645  Place in Observation  Once                          Orders Placed This Encounter   Procedures    Place in Observation     Standing Status:   Standing     Number of Occurrences:   1     Order Specific Question:   Level of Care     Answer:   Med Surg [16]     ED Arrival Information       Expected   -    Arrival   2/11/2024 03:23    Acuity   Urgent              Means of arrival   Walk-In    Escorted by   Self    Service   Hospitalist    Admission type   Emergency              Arrival complaint   breast pain             Chief Complaint   Patient presents with    Post-op Problem     Patient comes in reporting post op pain/ possible infection. First noticed cyst under R arm on the 1/20th. States on 1/29 she came in and had cyst drained/flushed/packed. Had packing removed 2 days later.  Reports she took full course of abx. States she developed redness/ swelling and itching. Area hot to the touch. Denies drainage. Swelling beginning to spread down R arm and into breast tissue (swelling noted on R hand and breast)        Initial Presentation: 33 y.o. female with hx depression, s/p  I and D in ED 1/28/24 of R axilla abscess 2/2 ingrown hair and d/c'ed on Doxycycline (completed 2/5/24 )who presents to ED from home due to increased tenderness and swelling at the site with radiation to her right breast as well as associated nausea and chills. Patient complains of right hand swelling as well which she states has been an ongoing intermittent issue for quite some time. Pt did not F/U with general sx s/p I and D . On exam,Right axilla with redness, tenderness and healing I&D site  . R hand swollen , decreased ROM . CT RUE shows no recurrent cyst .  Wound culture/gram stain (01/29/2024) grew Cutibacterium avidum.   Given IVF, IV/ po abx in ED. Admitted as OBS with cellulitis R axilla, R hand  swelling . Plan - IV Ancef, Pain control, prn antiemetics. Elevate RUE.     Date: 2/12   Overnight, pt developed L hand swelling . D dimer +. Pt started on lovenox, ordered venous duplex LUE .     ED Triage Vitals [02/11/24 0347]   Temperature Pulse Respirations Blood Pressure SpO2   98.3 °F (36.8 °C) 86 20 120/57 99 %      Temp Source Heart Rate Source Patient Position - Orthostatic VS BP Location FiO2 (%)   Oral Monitor Sitting Right arm --      Pain Score       6          Wt Readings from Last 1 Encounters:   02/11/24 74.8 kg (165 lb)     Additional Vital Signs:   te/Time Temp Pulse Resp BP MAP (mmHg) SpO2 O2 Device Patient Position - Orthostatic VS   02/11/24 22:18:50 98 °F (36.7 °C) 74 17 99/53 68 96 % None (Room air) Lying   02/11/24 15:24:24 -- 103 -- -- -- 97 % -- --   02/11/24 1524 97.4 °F (36.3 °C) Abnormal  -- 18 104/60 75 -- None (Room air) Sitting   02/11/24 0900 -- -- -- -- -- -- None (Room air) --   02/11/24 0730 97.9 °F (36.6 °C) 90 18 109/56 -- 99 % -- --   02/11/24 0625 -- 91 18 114/55 79 99 % None (Room air) Sittin     Pertinent Labs/Diagnostic Test Results:   CT upper extremity w contrast right   Final Result by Ofelia Chen MD (02/11 0600)      Expected findings status post recent I&D. No recurrent cyst.         Workstation performed: [id number]         VAS upper limb venous duplex scan, unilateral/limited    (Results Pending)         Results from last 7 days   Lab Units 02/12/24  0604 02/11/24  0440   WBC Thousand/uL 7.77 9.49   HEMOGLOBIN g/dL 10.9* 12.2   HEMATOCRIT % 33.2* 37.9   PLATELETS Thousands/uL 418* 480*   NEUTROS ABS Thousands/µL 3.20 6.68         Results from last 7 days   Lab Units 02/12/24  0604 02/11/24  0440   SODIUM mmol/L 136 135   POTASSIUM mmol/L 3.7 3.6   CHLORIDE mmol/L 111* 104   CO2 mmol/L 22 23   ANION GAP mmol/L 3 8   BUN mg/dL 10 20   CREATININE mg/dL 0.52* 0.57*   EGFR ml/min/1.73sq m 125 122   CALCIUM mg/dL 8.2* 9.1     Results from last 7 days   Lab Units  "02/11/24  0440   AST U/L 11*   ALT U/L 15   ALK PHOS U/L 59   TOTAL PROTEIN g/dL 7.8   ALBUMIN g/dL 4.3   TOTAL BILIRUBIN mg/dL 0.34         Results from last 7 days   Lab Units 02/12/24  0604 02/11/24  0440   GLUCOSE RANDOM mg/dL 119 90             No results found for: \"BETA-HYDROXYBUTYRATE\"               Results from last 7 days   Lab Units 02/11/24  0440   CK TOTAL U/L 50         Results from last 7 days   Lab Units 02/11/24  2352   D-DIMER QUANTITATIVE ug/ml FEU 1.52*     Results from last 7 days   Lab Units 02/11/24  0440   PROTIME seconds 14.4   INR  1.05   PTT seconds 33         Results from last 7 days   Lab Units 02/12/24  0604 02/11/24  0440   PROCALCITONIN ng/ml <0.05 <0.05     Results from last 7 days   Lab Units 02/11/24  0440   LACTIC ACID mmol/L 0.8                                     Results from last 7 days   Lab Units 02/11/24  0440   CRP mg/L 2.6                                             Results from last 7 days   Lab Units 02/11/24  0440   BLOOD CULTURE  Received in Microbiology Lab. Culture in Progress.  Received in Microbiology Lab. Culture in Progress.                   ED Treatment:   Medication Administration from 02/11/2024 0322 to 02/11/2024 0742         Date/Time Order Dose Route Action     02/11/2024 0430 EST cephalexin (KEFLEX) capsule 500 mg 500 mg Oral Given     02/11/2024 0517 EST iohexol (OMNIPAQUE) 350 MG/ML injection (MULTI-DOSE) 85 mL 85 mL Intravenous Given     02/11/2024 0639 EST vancomycin (VANCOCIN) IVPB (premix in dextrose) 1,000 mg 200 mL 1,000 mg Intravenous New Bag     02/11/2024 0622 EST sodium chloride 0.9 % bolus 1,000 mL 1,000 mL Intravenous New Bag     02/11/2024 0622 EST diphenhydrAMINE (BENADRYL) tablet 25 mg 25 mg Oral Given          Past Medical History:   Diagnosis Date    Abnormal TSH 06/10/2015    Disease of thyroid gland      Present on Admission:   Severe depression (HCC)      Admitting Diagnosis: Cellulitis [L03.90]  Cellulitis of right axilla " [L03.111]  Age/Sex: 33 y.o. female  Admission Orders:  Scheduled Medications:  cefazolin, 1,000 mg, Intravenous, Q8H  enoxaparin, 1 mg/kg, Subcutaneous, Q12H ALEC      Continuous IV Infusions:     PRN Meds:  acetaminophen, 650 mg, Oral, Q6H PRN  dextromethorphan-guaiFENesin, 10 mL, Oral, Q4H PRN x1 2/11  hydrOXYzine HCL, 25 mg, Oral, Q6H PRN x1 2/11  ondansetron, 4 mg, Intravenous, Q6H PRN    ketorolac (TORADOL) injection 15 mg  Dose: 15 mg  Freq: Every 6 hours PRN Route: IV  PRN Reason: moderate pain  Start: 02/11/24 0801 End: 02/12/24 0122  x1 2/11        Elevate RUE    Reg diet   OOB as caryl    Ambulate TID          Network Utilization Review Department  ATTENTION: Please call with any questions or concerns to [phone] and carefully listen to the prompts so that you are directed to the right person. All voicemails are confidential.   For Discharge needs, contact Care Management DC Support Team at [phone] opt. 2  Send all requests for admission clinical reviews, approved or denied determinations and any other requests to dedicated fax number below belonging to the campus where the patient is receiving treatment. List of dedicated fax numbers for the Facilities:  FACILITY NAME UR FAX NUMBER   ADMISSION DENIALS (Administrative/Medical Necessity) [phone]   DISCHARGE SUPPORT TEAM (NETWORK) [phone]   PARENT CHILD HEALTH (Maternity/NICU/Pediatrics) [phone]   [facility] [phone]   [facility] [phone]   [facility] [phone]   [facility] [phone]   [facility] [phone]   [facility] [phone]   [facility] [phone]   [facility] [phone]   [facility] [phone]   [facility] [phone]   [facility]  [facility] [phone]   [facility] [phone]

## 2024-02-13 NOTE — ASSESSMENT & PLAN NOTE
Overnight, patient developed left upper extremity swelling, where she had previous IV and blood draws, which according to the patient had been subsiding today.  Duplex scan done revealed no DVTs, but was found to have acute nonocclusive superficial thrombophlebitis in the median cubital vein and basilic vein at the antecubital fossa.  Patient was instructed to have warm compresses first and eventually cold compress.  Outpatient follow-up with primary care physician.

## 2024-02-13 NOTE — ASSESSMENT & PLAN NOTE
Patient reported history of right hand swelling.  This had resolved today.  No redness, wound or abrasion appreciated.  Suspect current exacerbation of right hand swelling 2/2 positioning related to above issue of right axilla.  Elevate extremity.  Outpatient follow-up with primary care physician.  According to the patient, she has history of on and off swelling of her joints (different joints), and had been referred to rheumatologist by her primary care physician in the past.  Since patient told me that she still having this intermittent swelling of her joints, I instructed the patient to follow-up with her primary care physician and I am recommending to patient's PCP to possibly further evaluate this and/or possibly refer the patient back to the rheumatologist.

## 2024-02-13 NOTE — ASSESSMENT & PLAN NOTE
"Presentation: Patient originally presented to the ED on 01/28/2024 due to right axilla abscess secondary to ingrown hair at which time she underwent an I&D in the ED and was discharged home on Doxycycline. Patient was to follow up with general surgery which she did not. Patient reports that she completed the Doxycycline on 02/05/2023. Patient returns to the ED due to increased tenderness and swelling at the site with radiation to her right breast as well as associated nausea and chills.   Wound culture/gram stain (01/29/2024) grew Cutibacterium avidum.   CT right upper extremity: \"Expected findings status post recent I&D. No recurrent cyst.\"  Patient did not meet two SIRS criteria on admission.  Received PO Keflex and IV Vancomycin in the ED. Continue IV Ancef.  Pain control.  IV antiemetics.    Today, right axillary swelling and erythema, had subsided significantly.  On discharge, patient was prescribed with Keflex for 8 more days to complete 10 days of antibiotic treatment.  Outpatient follow-up with primary care physician.  "

## 2024-02-13 NOTE — ASSESSMENT & PLAN NOTE
Per chart review, patient formerly on Prozac; however, patient reports no current daily prescription medication use.  Outpatient follow-up with primary care physician.

## 2024-02-13 NOTE — ASSESSMENT & PLAN NOTE
Patient's hemoglobin went down today, from normal, but no active bleeding.  Possibly hemodilution from IV fluids.  According to the patient, she was prescribed with iron pills in the past, however she did not take this medication.  On review, based on CBCs in epic, she had anemia many years ago (2015/2016), however none since 2023.  Outpatient follow-up with primary care physician.  I am recommending rechecking/monitoring patient's CBC.  This was discussed with the patient.

## 2024-02-14 ENCOUNTER — TELEPHONE (OUTPATIENT)
Dept: FAMILY MEDICINE CLINIC | Facility: CLINIC | Age: 34
End: 2024-02-14

## 2024-02-14 ENCOUNTER — TRANSITIONAL CARE MANAGEMENT (OUTPATIENT)
Dept: FAMILY MEDICINE CLINIC | Facility: CLINIC | Age: 34
End: 2024-02-14

## 2024-02-14 NOTE — TELEPHONE ENCOUNTER
Patient called back and is canceling her TCM appointment because she has no insurance and she wants to wait until she gets Medicare or medical assistance.. I explained to her if she is having a problem that she should keep  appointment.. she will call back when she gets insurance

## 2024-02-14 NOTE — TELEPHONE ENCOUNTER
Pt explained to MA who scheduled appt that she will be without insurance for her TCM upcoming appt.     Pt was curious what a self pay cost would be.    Message left explaining that cost of visit is $354 so if she pays $25 at time of visit she will be billed at a 50% discount.  Her responsibility would be $177.  Explained if she was not comfortable with that she should call me to discuss further.

## 2024-02-16 LAB
BACTERIA BLD CULT: NORMAL
BACTERIA BLD CULT: NORMAL